# Patient Record
Sex: MALE | Race: WHITE | NOT HISPANIC OR LATINO | Employment: FULL TIME | URBAN - METROPOLITAN AREA
[De-identification: names, ages, dates, MRNs, and addresses within clinical notes are randomized per-mention and may not be internally consistent; named-entity substitution may affect disease eponyms.]

---

## 2023-02-28 LAB
CALCIDIOL (25 OH VITAMIN D3) (NG/ML) IN SER/PLAS: 19 NG/ML
COBALAMIN (VITAMIN B12) (PG/ML) IN SER/PLAS: 301 PG/ML (ref 211–911)

## 2023-03-01 LAB
ALLERGEN ANIMAL: CAT DANDER IGE (KU/L): 0.86 KU/L
ALLERGEN ANIMAL: CAT DANDER IGE (KU/L): 0.86 KU/L
ALLERGEN ANIMAL: DOG DANDER IGE (KU/L): 18.9 KU/L
ALLERGEN ANIMAL: DOG DANDER IGE (KU/L): 18.9 KU/L
ALLERGEN FOOD: EGG WHITE IGE (KU/L): <0.1 KU/L
ALLERGEN FOOD: FISH (COD) GADUS MORHUA) IGE (KU/L): <0.1 KU/L
ALLERGEN FOOD: MILK IGE (KU/L): <0.1 KU/L
ALLERGEN FOOD: PEANUT (ARACHIS HYPOGAEA) IGE (KU/L): <0.1 KU/L
ALLERGEN FOOD: SHRIMP (P. BOREALIS/MONODON, M. BARBATA/JOYNERI) IGE (KU/L): 0.37 KU/L
ALLERGEN FOOD: SOYBEAN (GLYCINE MAX) IGE (KU/L): <0.1 KU/L
ALLERGEN FOOD: WALNUT (JUGLANS SPP.) IGE (KU/L): <0.1 KU/L
ALLERGEN FOOD: WHEAT (TRITICUM AESTIVUM) IGE (KU/L): <0.1 KU/L
ALLERGEN GRASS: BERMUDA GRASS (CYNODON DACTYLON) IGE (KU/L): <0.1 KU/L
ALLERGEN GRASS: JOHNSON GRASS (SORGHUM HALEPENSE) IGE (KU/L): <0.1 KU/L
ALLERGEN GRASS: MEADOW GRASS, KENTUCKY BLUE (POA PRATENSIS )IGE (KU/L): <0.1 KU/L
ALLERGEN GRASS: TIMOTHY GRASS (PHLEUM PRATENSE) IGE (KU/L): <0.1 KU/L
ALLERGEN INSECT: COCKROACH IGE: 0.33 KU/L
ALLERGEN INSECT: COCKROACH IGE: 0.33 KU/L
ALLERGEN MICROORGANISM: ALTERNARIA ALTERNATA IGE (KU/L): <0.1 KU/L
ALLERGEN MICROORGANISM: ALTERNARIA ALTERNATA IGE (KU/L): <0.1 KU/L
ALLERGEN MICROORGANISM: ASPERGILLUS FUMIGATUS IGE (KU/L): <0.1 KU/L
ALLERGEN MICROORGANISM: CLADOSPORIUM HERBARUM IGE (KU/L): <0.1 KU/L
ALLERGEN MICROORGANISM: CLADOSPORIUM HERBARUM IGE (KU/L): <0.1 KU/L
ALLERGEN MICROORGANISM: PENICILLIUM CHRYSOGENUM (P. NOTATUM) IGE (KU/L): <0.1 KU/L
ALLERGEN MITE: DERMATOPHAGOIDES FARINAE (HOUSE DUST MITE) IGE (KU/L): 1.24 KU/L
ALLERGEN MITE: DERMATOPHAGOIDES FARINAE (HOUSE DUST MITE) IGE (KU/L): 1.24 KU/L
ALLERGEN MITE: DERMATOPHAGOIDES PTERONYSSINUS (HOUSE DUST MITE) IGE (KU/L): 0.95 KU/L
ALLERGEN MITE: DERMATOPHAGOIDES PTERONYSSINUS (HOUSE DUST MITE) IGE (KU/L): 0.95 KU/L
ALLERGEN TREE: BOX-ELDER (ACER NEGUNDO) IGE (KU/L): <0.1 KU/L
ALLERGEN TREE: COMMON SILVER BIRCH (BETULA VERRUCOSA) IGE (KU/L): <0.1 KU/L
ALLERGEN TREE: COTTONWOOD (POPULUS DELTOIDES) IGE (KU/L): <0.1 KU/L
ALLERGEN TREE: ELM (ULMUS AMERICANA) IGE (KU/L): <0.1 KU/L
ALLERGEN TREE: MAPLE LEAF SYCAMORE, LONDON PLANE IGE (KU/L): <0.1 KU/L
ALLERGEN TREE: MOUNTAIN JUNIPER (JUNIPERUS SABINOIDES) IGE (KU/L): <0.1 KU/L
ALLERGEN TREE: MULBERRY (MORUS ALBA) IGE (KU/L): <0.1 KU/L
ALLERGEN TREE: OAK (QUERCUS ALBA) IGE (KU/L): 0.11 KU/L
ALLERGEN TREE: PECAN, HICKORY (CARYA PECAN) IGE (KU/L): <0.1 KU/L
ALLERGEN TREE: WALNUT IGE: <0.1 KU/L
ALLERGEN TREE: WHITE ASH (FRAXINUS AMERICANA) IGE (KU/L): <0.1 KU/L
ALLERGEN WEED: COMMON PIGWEED (AMARANTHUS RETROFLEXUS) IGE (KU/L): <0.1 KU/L
ALLERGEN WEED: COMMON RAGWEED (AMB. ARTEMISIIFOLIA/A. ELATIOR) IGE (KU/L): <0.1 KU/L
ALLERGEN WEED: GOOSEFOOT, LAMB'S QUARTERS (CHENOPODIUM ALBUM) IGE (KU/L): 0.1 KU/L
ALLERGEN WEED: PLANTAIN (ENGLISH), RIBWORT (PLANTAGO LANCEOLATA) IGE (KU/L): <0.1 KU/L
ALLERGEN WEED: PRICKLY SALTWORT/RUSSIAN THISTLE (SALSOLA KALI) IGE (KU/L): <0.1 KU/L
ALLERGEN WEED: SHEEP SORREL (RUMEX ACETOSELLA) IGE (KU/L): <0.1 KU/L
IMMUNOCAP IGE: 158 KU/L (ref 0–214)
IMMUNOCAP IGE: 158 KU/L (ref 0–214)
IMMUNOCAP INTERPRETATION: ABNORMAL
IMMUNOCAP INTERPRETATION: ABNORMAL

## 2023-04-06 PROBLEM — M19.079 1ST MTP ARTHRITIS: Status: ACTIVE | Noted: 2023-04-06

## 2023-04-06 PROBLEM — E55.9 VITAMIN D DEFICIENCY: Status: ACTIVE | Noted: 2023-04-06

## 2023-04-06 PROBLEM — J02.9 PHARYNGITIS: Status: ACTIVE | Noted: 2023-04-06

## 2023-04-06 PROBLEM — D22.9 CHANGE IN MULTIPLE NEVI: Status: ACTIVE | Noted: 2023-04-06

## 2023-04-06 PROBLEM — M67.40 GANGLION CYST: Status: ACTIVE | Noted: 2023-04-06

## 2023-04-06 PROBLEM — J45.909 ASTHMA (HHS-HCC): Status: ACTIVE | Noted: 2023-04-06

## 2023-04-06 PROBLEM — S86.312A PERONEAL TENDON TEAR, LEFT, INITIAL ENCOUNTER: Status: ACTIVE | Noted: 2023-04-06

## 2023-04-06 PROBLEM — M54.50 LUMBAGO: Status: ACTIVE | Noted: 2023-04-06

## 2023-04-06 PROBLEM — M67.80 CYST OF TENDON SHEATH: Status: ACTIVE | Noted: 2023-04-06

## 2023-04-06 PROBLEM — R19.05 ABDOMINAL WALL MASS OF PERIUMBILICAL REGION: Status: ACTIVE | Noted: 2023-04-06

## 2023-04-06 PROBLEM — K43.9 SUPRAUMBILICAL HERNIA: Status: ACTIVE | Noted: 2023-04-06

## 2023-04-06 PROBLEM — T14.8XXA TENDON TEAR: Status: ACTIVE | Noted: 2023-04-06

## 2023-04-06 PROBLEM — F33.0 MILD EPISODE OF RECURRENT MAJOR DEPRESSIVE DISORDER (CMS-HCC): Status: ACTIVE | Noted: 2023-04-06

## 2023-04-06 PROBLEM — F41.9 ANXIETY: Status: ACTIVE | Noted: 2023-04-06

## 2023-04-06 RX ORDER — MONTELUKAST SODIUM 10 MG/1
1 TABLET ORAL DAILY
COMMUNITY
Start: 2018-10-03 | End: 2023-10-10

## 2023-04-06 RX ORDER — ACETAMINOPHEN 500 MG
50 TABLET ORAL DAILY
COMMUNITY
Start: 2022-10-13

## 2023-04-06 RX ORDER — FLUTICASONE PROPIONATE AND SALMETEROL 100; 50 UG/1; UG/1
1 POWDER RESPIRATORY (INHALATION) 2 TIMES DAILY
COMMUNITY
Start: 2022-05-04 | End: 2023-04-17

## 2023-04-06 RX ORDER — ALBUTEROL SULFATE 90 UG/1
1-2 AEROSOL, METERED RESPIRATORY (INHALATION) AS NEEDED
COMMUNITY
Start: 2018-10-03 | End: 2023-09-19

## 2023-04-06 RX ORDER — ERGOCALCIFEROL 1.25 MG/1
CAPSULE ORAL
COMMUNITY
End: 2024-01-17 | Stop reason: ALTCHOICE

## 2023-04-07 ENCOUNTER — OFFICE VISIT (OUTPATIENT)
Dept: PRIMARY CARE | Facility: CLINIC | Age: 33
End: 2023-04-07
Payer: COMMERCIAL

## 2023-04-07 VITALS
OXYGEN SATURATION: 98 % | BODY MASS INDEX: 24.92 KG/M2 | RESPIRATION RATE: 16 BRPM | WEIGHT: 178 LBS | SYSTOLIC BLOOD PRESSURE: 110 MMHG | DIASTOLIC BLOOD PRESSURE: 73 MMHG | TEMPERATURE: 98.2 F | HEIGHT: 71 IN | HEART RATE: 65 BPM

## 2023-04-07 DIAGNOSIS — R19.00 INTRA-ABDOMINAL AND PELVIC SWELLING, MASS AND LUMP, UNSPECIFIED SITE: ICD-10-CM

## 2023-04-07 DIAGNOSIS — R19.00 ABDOMINAL MASS, UNSPECIFIED ABDOMINAL LOCATION: Primary | ICD-10-CM

## 2023-04-07 PROBLEM — R19.05 ABDOMINAL WALL MASS OF PERIUMBILICAL REGION: Status: RESOLVED | Noted: 2023-04-06 | Resolved: 2023-04-07

## 2023-04-07 PROCEDURE — 1036F TOBACCO NON-USER: CPT | Performed by: NURSE PRACTITIONER

## 2023-04-07 PROCEDURE — 99213 OFFICE O/P EST LOW 20 MIN: CPT | Performed by: NURSE PRACTITIONER

## 2023-04-07 ASSESSMENT — PATIENT HEALTH QUESTIONNAIRE - PHQ9
2. FEELING DOWN, DEPRESSED OR HOPELESS: NOT AT ALL
1. LITTLE INTEREST OR PLEASURE IN DOING THINGS: NOT AT ALL
SUM OF ALL RESPONSES TO PHQ9 QUESTIONS 1 AND 2: 0

## 2023-04-07 NOTE — PROGRESS NOTES
Subjective   Patient ID: Justin Carcamo is a 33 y.o. male who presents for mass on abdomen.  4 days ago  Bump in lower abdomen  Painful and swollen  Has had umbilical hernia in past  Repaired  Thought maybe ingrown hair  Plucked hair  Was red  But  to touch  No other s/s        Review of Systems  ROS completely negative except what was mentioned in the HPI.  Problem List, surgical, social, and family histories which were reviewed and updated as necessary within the EMR. I also personally reviewed the notes, labs, and imaging that pertained to what was documented or discussed in the HPI.      Objective   Physical Exam  Vitals and nursing note reviewed.   Constitutional:       General: He is not in acute distress.     Appearance: Normal appearance. He is not ill-appearing.   HENT:      Head: Normocephalic and atraumatic.      Right Ear: External ear normal.      Left Ear: External ear normal.      Nose: Nose normal.      Mouth/Throat:      Mouth: Mucous membranes are moist.   Eyes:      Extraocular Movements: Extraocular movements intact.      Conjunctiva/sclera: Conjunctivae normal.      Pupils: Pupils are equal, round, and reactive to light.   Pulmonary:      Effort: Pulmonary effort is normal.   Musculoskeletal:         General: Normal range of motion.      Cervical back: Normal range of motion and neck supple.   Skin:     General: Skin is warm and dry.      Comments: 1.5 cm tender, non erythematous nodule palpated on mid lower quadrant of abdomen. Does not protrude with bearing down or recede with external pressure   Neurological:      General: No focal deficit present.      Mental Status: He is alert and oriented to person, place, and time. Mental status is at baseline.   Psychiatric:         Mood and Affect: Mood normal.         Behavior: Behavior normal.         Thought Content: Thought content normal.         Judgment: Judgment normal.         /73 (BP Location: Left arm, Patient Position:  "Sitting, BP Cuff Size: Adult)   Pulse 65   Temp 36.8 °C (98.2 °F) (Temporal)   Resp 16   Ht 1.803 m (5' 11\")   Wt 80.7 kg (178 lb)   SpO2 98%   BMI 24.83 kg/m²     Assessment/Plan   Problem List Items Addressed This Visit       Abdominal mass - Primary     Likely a cyst  But will get US to rule out hernia since he recently had periumbilical hernia repair  Plastics referral for removal         Relevant Orders    US abdomen limited    Referral to Plastic Surgery     "

## 2023-04-07 NOTE — ASSESSMENT & PLAN NOTE
Likely a cyst  But will get US to rule out hernia since he recently had periumbilical hernia repair  Plastics referral for removal

## 2023-04-16 DIAGNOSIS — J45.909 UNSPECIFIED ASTHMA, UNCOMPLICATED (HHS-HCC): ICD-10-CM

## 2023-04-17 RX ORDER — CEPHALEXIN 250 MG/1
CAPSULE ORAL
Qty: 180 EACH | Refills: 3 | Status: SHIPPED | OUTPATIENT
Start: 2023-04-17 | End: 2024-04-07

## 2023-10-10 DIAGNOSIS — J45.909 UNSPECIFIED ASTHMA, UNCOMPLICATED (HHS-HCC): ICD-10-CM

## 2023-10-10 RX ORDER — MONTELUKAST SODIUM 10 MG/1
10 TABLET ORAL DAILY
Qty: 90 TABLET | Refills: 3 | Status: SHIPPED | OUTPATIENT
Start: 2023-10-10

## 2024-01-17 ENCOUNTER — OFFICE VISIT (OUTPATIENT)
Dept: PRIMARY CARE | Facility: CLINIC | Age: 34
End: 2024-01-17
Payer: COMMERCIAL

## 2024-01-17 VITALS
BODY MASS INDEX: 25.37 KG/M2 | OXYGEN SATURATION: 98 % | HEART RATE: 64 BPM | SYSTOLIC BLOOD PRESSURE: 111 MMHG | DIASTOLIC BLOOD PRESSURE: 71 MMHG | HEIGHT: 71 IN | TEMPERATURE: 97.7 F | WEIGHT: 181.2 LBS

## 2024-01-17 DIAGNOSIS — S06.0X0A CONCUSSION WITHOUT LOSS OF CONSCIOUSNESS, INITIAL ENCOUNTER: Primary | ICD-10-CM

## 2024-01-17 PROCEDURE — 99213 OFFICE O/P EST LOW 20 MIN: CPT | Performed by: INTERNAL MEDICINE

## 2024-01-17 PROCEDURE — 1036F TOBACCO NON-USER: CPT | Performed by: INTERNAL MEDICINE

## 2024-01-17 RX ORDER — METHYLPHENIDATE 17.3 MG/1
TABLET, ORALLY DISINTEGRATING ORAL
COMMUNITY
Start: 2023-09-06 | End: 2024-04-03 | Stop reason: ALTCHOICE

## 2024-01-17 ASSESSMENT — PATIENT HEALTH QUESTIONNAIRE - PHQ9
1. LITTLE INTEREST OR PLEASURE IN DOING THINGS: NOT AT ALL
2. FEELING DOWN, DEPRESSED OR HOPELESS: NOT AT ALL
SUM OF ALL RESPONSES TO PHQ9 QUESTIONS 1 AND 2: 0

## 2024-01-17 NOTE — PROGRESS NOTES
"Subjective   Patient ID: Justin Carcamo is a 33 y.o. male who presents for Concussion (Hit head -top of head last night /No loss of consciousness/Headaches, sensitivity to sounds,sensitivity to light, struggling to focus  ).  HPI  Fell after hitting head  Ho concussion in [place]  Did not think lost consciousness at that time   Last night no oc   Had orchestra rehearsal  Lights hurt head     Tylenol helped  Cannot focus at work   It help desk  No vtg  No nausea  Balance ok    No vision change or focal weakness  Speech no trouble   Snowboarding 10 years ago     Review of Systems  Gen:  no fever  HEENT:  no trouble swallowing  CV:  no dyspnea, cyanosis  Lungs:  no shortness of breath  GI:  no constipation, no blood in stool  Vascular:  no edema  Neuro:   no weakness  Skin:  no rash  MS:no joint swelling  Gu:  no urinary complaints  All other systems have been reviewed and are negative for complaint    /71   Pulse 64   Temp 36.5 °C (97.7 °F) (Temporal)   Ht 1.803 m (5' 11\")   Wt 82.2 kg (181 lb 3.2 oz)   SpO2 98%   BMI 25.27 kg/m²   Objective   Physical Exam  Lab Results   Component Value Date    WBC 5.3 10/12/2022    HGB 14.5 10/12/2022    HCT 46.8 10/12/2022    MCV 85 10/12/2022     10/12/2022     Lab Results   Component Value Date    GLUCOSE 85 10/12/2022    CALCIUM 9.6 10/12/2022     10/12/2022    K 4.1 10/12/2022    CO2 32 10/12/2022     10/12/2022    BUN 11 10/12/2022    CREATININE 1.02 10/12/2022     Social History     Socioeconomic History    Marital status:      Spouse name: None    Number of children: None    Years of education: None    Highest education level: None   Occupational History    None   Tobacco Use    Smoking status: Never    Smokeless tobacco: Never   Substance and Sexual Activity    Alcohol use: Not Currently    Drug use: Never    Sexual activity: None   Other Topics Concern    None   Social History Narrative    None     Social Determinants of Health "     Financial Resource Strain: Not on file   Food Insecurity: Not on file   Transportation Needs: Not on file   Physical Activity: Not on file   Stress: Not on file   Social Connections: Not on file   Intimate Partner Violence: Not on file   Housing Stability: Not on file     Family History   Problem Relation Name Age of Onset    Breast cancer Mother      Diabetes Mother      Other (Brain tumor) Mother      Other (GI resection) Father          not cancer    Lung cancer Maternal Grandmother         General:  Alert and in  NAD  Heent:  tms nl, throat clear.     Lungs, CTAB  Skin:  no suspicious lesions,  warm and dry  Head :  Normocephalic  Neck/thyroid:  neck supple, full rom, no cervical lymphadenopathy  no thyromegaly  Heart:  RRR  no murmurs  Abdomen:  Normal , bs present, soft, nontender, not distended, no masses palpated  Extremities:  No clubbing, cyanosis, or edema  Neurologic:  Nonfocal  Psych: alert, normal mood      Nonfocal exam.  Equal 5/5 strength   Perrl , eomi  mild horizontal nystagmus  perrl  Dtrs equal plus 1-2   Clear speech , nl gait   Justin was seen today for concussion.  Diagnoses and all orders for this visit:  Concussion without loss of consciousness, initial encounter (Primary)  -     Referral to Physical Therapy; Future     Sx care discussed  Off work for 3 days  Fu prn   Go to er if sx worsen  Discussed no ct needed at this time, expect full recovery

## 2024-01-31 ENCOUNTER — OFFICE VISIT (OUTPATIENT)
Dept: PRIMARY CARE | Facility: CLINIC | Age: 34
End: 2024-01-31
Payer: COMMERCIAL

## 2024-01-31 ENCOUNTER — HOSPITAL ENCOUNTER (OUTPATIENT)
Dept: RADIOLOGY | Facility: CLINIC | Age: 34
Discharge: HOME | End: 2024-01-31
Payer: COMMERCIAL

## 2024-01-31 VITALS
TEMPERATURE: 98.4 F | HEART RATE: 80 BPM | WEIGHT: 178 LBS | DIASTOLIC BLOOD PRESSURE: 82 MMHG | OXYGEN SATURATION: 97 % | BODY MASS INDEX: 24.83 KG/M2 | SYSTOLIC BLOOD PRESSURE: 126 MMHG

## 2024-01-31 DIAGNOSIS — R05.9 COUGH, UNSPECIFIED TYPE: ICD-10-CM

## 2024-01-31 DIAGNOSIS — J45.41 MODERATE PERSISTENT ASTHMA WITH ACUTE EXACERBATION (HHS-HCC): ICD-10-CM

## 2024-01-31 DIAGNOSIS — R05.1 ACUTE COUGH: Primary | ICD-10-CM

## 2024-01-31 PROCEDURE — 99214 OFFICE O/P EST MOD 30 MIN: CPT | Performed by: INTERNAL MEDICINE

## 2024-01-31 PROCEDURE — 87637 SARSCOV2&INF A&B&RSV AMP PRB: CPT

## 2024-01-31 PROCEDURE — 71046 X-RAY EXAM CHEST 2 VIEWS: CPT | Mod: FR

## 2024-01-31 PROCEDURE — 1036F TOBACCO NON-USER: CPT | Performed by: INTERNAL MEDICINE

## 2024-01-31 PROCEDURE — 71045 X-RAY EXAM CHEST 1 VIEW: CPT | Mod: FOREIGN READ | Performed by: RADIOLOGY

## 2024-01-31 RX ORDER — AZITHROMYCIN 250 MG/1
TABLET, FILM COATED ORAL
Qty: 6 TABLET | Refills: 0 | Status: SHIPPED | OUTPATIENT
Start: 2024-01-31 | End: 2024-02-05

## 2024-01-31 RX ORDER — PREDNISONE 20 MG/1
60 TABLET ORAL DAILY
Qty: 15 TABLET | Refills: 0 | Status: SHIPPED | OUTPATIENT
Start: 2024-01-31 | End: 2024-02-05

## 2024-01-31 ASSESSMENT — PATIENT HEALTH QUESTIONNAIRE - PHQ9
2. FEELING DOWN, DEPRESSED OR HOPELESS: NOT AT ALL
SUM OF ALL RESPONSES TO PHQ9 QUESTIONS 1 AND 2: 0
1. LITTLE INTEREST OR PLEASURE IN DOING THINGS: NOT AT ALL

## 2024-01-31 NOTE — PROGRESS NOTES
Subjective   Patient ID: Justin Carcamo is a 33 y.o. male who presents for Cough (Congestion, fever, cough 3-4 days/House guest positive for covid/).  HPI  Fever  Deep cough  Fever broke last night  Always settles in chest  Has covid tests at home   Better from concussion   Review of Systems   HEENT:  no trouble swallowing  CV:  no dyspnea, cyanosis  Lungs:  no shortness of breath  GI:  no constipation, no blood in stool  Vascular:  no edema  Neuro:   no weakness  Skin:  no rash  MS:no joint swelling  Gu:  no urinary complaints  All other systems have been reviewed and are negative for complaint    /82   Pulse 80   Temp 36.9 °C (98.4 °F) (Temporal)   Wt 80.7 kg (178 lb)   SpO2 97%   BMI 24.83 kg/m²   Objective   Physical Exam  Lab Results   Component Value Date    WBC 5.3 10/12/2022    HGB 14.5 10/12/2022    HCT 46.8 10/12/2022    MCV 85 10/12/2022     10/12/2022     Lab Results   Component Value Date    GLUCOSE 85 10/12/2022    CALCIUM 9.6 10/12/2022     10/12/2022    K 4.1 10/12/2022    CO2 32 10/12/2022     10/12/2022    BUN 11 10/12/2022    CREATININE 1.02 10/12/2022     Social History     Socioeconomic History    Marital status:      Spouse name: None    Number of children: None    Years of education: None    Highest education level: None   Occupational History    None   Tobacco Use    Smoking status: Never    Smokeless tobacco: Never   Substance and Sexual Activity    Alcohol use: Not Currently    Drug use: Never    Sexual activity: None   Other Topics Concern    None   Social History Narrative    None     Social Determinants of Health     Financial Resource Strain: Not on file   Food Insecurity: Not on file   Transportation Needs: Not on file   Physical Activity: Not on file   Stress: Not on file   Social Connections: Not on file   Intimate Partner Violence: Not on file   Housing Stability: Not on file     Family History   Problem Relation Name Age of Onset    Breast  cancer Mother      Diabetes Mother      Other (Brain tumor) Mother      Other (GI resection) Father          not cancer    Lung cancer Maternal Grandmother         General:  Alert and in  NAD  Heent:  tms nl,      Lungs, diffuse exp wheeze  good ae    Skin:  no suspicious lesions,  warm and dry  Head :  Normocephalic  Neck/thyroid:  neck supple, full rom, no cervical lymphadenopathy  no thyromegaly  Heart:  RRR  no murmurs   Extremities:  No clubbing, cyanosis, or edema  Neurologic:  Nonfocal  Psych: alert, normal mood      Justin was seen today for cough.  Diagnoses and all orders for this visit:  Acute cough (Primary)  -     RSV PCR  -     Influenza A, and B PCR  -     Sars-CoV-2 PCR, Symptomatic  Cough, unspecified type  -     XR chest 2 views; Future  Moderate persistent asthma with acute exacerbation  -     predniSONE (Deltasone) 20 mg tablet; Take 3 tablets (60 mg) by mouth once daily for 5 days.  -     azithromycin (Zithromax) 250 mg tablet; Take 2 tablets (500 mg) by mouth once daily for 1 day, THEN 1 tablet (250 mg) once daily for 4 days. Take 2 tabs (500 mg) by mouth today, than 1 daily for 4 days..    Pt will do neb tx when he gets home  Go to er if sx worsen  Will call if covid pos at home as well  Would do paxlovid for him   Stop advair while on prednisone

## 2024-02-01 LAB
FLUAV RNA RESP QL NAA+PROBE: NOT DETECTED
FLUBV RNA RESP QL NAA+PROBE: NOT DETECTED
RSV RNA RESP QL NAA+PROBE: DETECTED
SARS-COV-2 RNA RESP QL NAA+PROBE: NOT DETECTED

## 2024-02-12 ENCOUNTER — APPOINTMENT (OUTPATIENT)
Dept: PRIMARY CARE | Facility: CLINIC | Age: 34
End: 2024-02-12
Payer: COMMERCIAL

## 2024-02-16 ENCOUNTER — APPOINTMENT (OUTPATIENT)
Dept: PRIMARY CARE | Facility: CLINIC | Age: 34
End: 2024-02-16
Payer: COMMERCIAL

## 2024-02-27 ENCOUNTER — APPOINTMENT (OUTPATIENT)
Dept: PRIMARY CARE | Facility: CLINIC | Age: 34
End: 2024-02-27
Payer: COMMERCIAL

## 2024-04-03 ENCOUNTER — OFFICE VISIT (OUTPATIENT)
Dept: PRIMARY CARE | Facility: CLINIC | Age: 34
End: 2024-04-03
Payer: COMMERCIAL

## 2024-04-03 VITALS
TEMPERATURE: 97.7 F | BODY MASS INDEX: 25.48 KG/M2 | HEIGHT: 70 IN | OXYGEN SATURATION: 98 % | WEIGHT: 178 LBS | SYSTOLIC BLOOD PRESSURE: 121 MMHG | HEART RATE: 70 BPM | DIASTOLIC BLOOD PRESSURE: 70 MMHG

## 2024-04-03 DIAGNOSIS — E55.9 VITAMIN D DEFICIENCY: ICD-10-CM

## 2024-04-03 DIAGNOSIS — J45.41 MODERATE PERSISTENT ASTHMA WITH ACUTE EXACERBATION (HHS-HCC): ICD-10-CM

## 2024-04-03 DIAGNOSIS — Z00.00 WELL ADULT EXAM: Primary | ICD-10-CM

## 2024-04-03 DIAGNOSIS — I45.9 INTRAVENTRICULAR CONDUCTION DELAY: ICD-10-CM

## 2024-04-03 DIAGNOSIS — Z79.899 MEDICATION MANAGEMENT: ICD-10-CM

## 2024-04-03 PROBLEM — T14.8XXA TENDON TEAR: Status: RESOLVED | Noted: 2023-04-06 | Resolved: 2024-04-03

## 2024-04-03 PROBLEM — M25.569 KNEE PAIN: Status: ACTIVE | Noted: 2024-04-03

## 2024-04-03 PROBLEM — R05.9 COUGH: Status: RESOLVED | Noted: 2024-04-03 | Resolved: 2024-04-03

## 2024-04-03 PROBLEM — J02.9 PHARYNGITIS: Status: RESOLVED | Noted: 2023-04-06 | Resolved: 2024-04-03

## 2024-04-03 PROBLEM — M79.646 PAIN OF FINGER: Status: ACTIVE | Noted: 2024-04-03

## 2024-04-03 PROBLEM — K92.1 HEMATOCHEZIA: Status: ACTIVE | Noted: 2024-04-03

## 2024-04-03 PROBLEM — R53.83 FATIGUE: Status: ACTIVE | Noted: 2024-04-03

## 2024-04-03 PROBLEM — J06.9 UPPER RESPIRATORY TRACT INFECTION: Status: RESOLVED | Noted: 2023-04-06 | Resolved: 2024-04-03

## 2024-04-03 PROBLEM — R53.83 FATIGUE: Status: RESOLVED | Noted: 2024-04-03 | Resolved: 2024-04-03

## 2024-04-03 PROBLEM — R10.84 GENERALIZED ABDOMINAL PAIN: Status: ACTIVE | Noted: 2024-04-03

## 2024-04-03 PROBLEM — M79.646 PAIN OF FINGER: Status: RESOLVED | Noted: 2024-04-03 | Resolved: 2024-04-03

## 2024-04-03 PROBLEM — J06.9 UPPER RESPIRATORY TRACT INFECTION: Status: ACTIVE | Noted: 2023-04-06

## 2024-04-03 PROBLEM — R10.84 GENERALIZED ABDOMINAL PAIN: Status: RESOLVED | Noted: 2024-04-03 | Resolved: 2024-04-03

## 2024-04-03 PROBLEM — R19.7 DIARRHEA: Status: RESOLVED | Noted: 2024-04-03 | Resolved: 2024-04-03

## 2024-04-03 PROBLEM — M25.569 KNEE PAIN: Status: RESOLVED | Noted: 2024-04-03 | Resolved: 2024-04-03

## 2024-04-03 PROBLEM — R05.9 COUGH: Status: ACTIVE | Noted: 2024-04-03

## 2024-04-03 PROBLEM — R04.2 BLOOD-STREAKED SPUTUM: Status: RESOLVED | Noted: 2024-04-03 | Resolved: 2024-04-03

## 2024-04-03 PROBLEM — R04.2 BLOOD-STREAKED SPUTUM: Status: ACTIVE | Noted: 2024-04-03

## 2024-04-03 PROBLEM — J84.9 INTERSTITIAL PNEUMONIA (MULTI): Status: RESOLVED | Noted: 2024-04-03 | Resolved: 2024-04-03

## 2024-04-03 PROBLEM — J84.9 INTERSTITIAL PNEUMONIA (MULTI): Status: ACTIVE | Noted: 2024-04-03

## 2024-04-03 PROBLEM — M99.09 SEGMENTAL AND SOMATIC DYSFUNCTION: Status: RESOLVED | Noted: 2024-04-03 | Resolved: 2024-04-03

## 2024-04-03 PROBLEM — M99.09 SEGMENTAL AND SOMATIC DYSFUNCTION: Status: ACTIVE | Noted: 2024-04-03

## 2024-04-03 PROBLEM — S86.312A PERONEAL TENDON TEAR, LEFT, INITIAL ENCOUNTER: Status: RESOLVED | Noted: 2023-04-06 | Resolved: 2024-04-03

## 2024-04-03 PROBLEM — S06.0X0A CONCUSSION WITHOUT LOSS OF CONSCIOUSNESS: Status: ACTIVE | Noted: 2024-04-03

## 2024-04-03 PROBLEM — R19.00 ABDOMINAL MASS: Status: RESOLVED | Noted: 2023-04-07 | Resolved: 2024-04-03

## 2024-04-03 PROBLEM — S33.6XXA SPRAIN OF SACROILIAC LIGAMENT: Status: ACTIVE | Noted: 2023-04-06

## 2024-04-03 PROBLEM — K92.1 HEMATOCHEZIA: Status: RESOLVED | Noted: 2024-04-03 | Resolved: 2024-04-03

## 2024-04-03 PROBLEM — M25.473 ANKLE SWELLING: Status: ACTIVE | Noted: 2024-04-03

## 2024-04-03 PROBLEM — R19.7 DIARRHEA: Status: ACTIVE | Noted: 2024-04-03

## 2024-04-03 PROCEDURE — 1036F TOBACCO NON-USER: CPT | Performed by: INTERNAL MEDICINE

## 2024-04-03 PROCEDURE — 93000 ELECTROCARDIOGRAM COMPLETE: CPT | Performed by: INTERNAL MEDICINE

## 2024-04-03 PROCEDURE — 99395 PREV VISIT EST AGE 18-39: CPT | Performed by: INTERNAL MEDICINE

## 2024-04-03 ASSESSMENT — PATIENT HEALTH QUESTIONNAIRE - PHQ9
1. LITTLE INTEREST OR PLEASURE IN DOING THINGS: NOT AT ALL
SUM OF ALL RESPONSES TO PHQ9 QUESTIONS 1 AND 2: 0
2. FEELING DOWN, DEPRESSED OR HOPELESS: NOT AT ALL

## 2024-04-03 NOTE — PROGRESS NOTES
"Subjective       Current Issues:  Current concerns include none  .asthma controlled  Has been, likes current advair dose    Sleep: all night if kids let him   No bowel or bladder issues  No cp or sob or depression    Review of Nutrition:  Current diet: trying  Exercise discussed    Gen:  no fever  HEENT:  no trouble swallowing  CV:  no dyspnea, cyanosis  Lungs:  no shortness of breath  GI:  no constipation, no blood in stool  Vascular:  no edema  Neuro:   no weakness  Skin:  no rash  MS:no joint swelling  Gu:  no urinary complaints  All other systems have been reviewed and are negative for complaint      Screening Questions:  Objective   /70   Pulse 70   Temp 36.5 °C (97.7 °F) (Temporal)   Ht 1.778 m (5' 10\")   Wt 80.7 kg (178 lb)   SpO2 98%   BMI 25.54 kg/m²       General:   alert and oriented, in no acute distress   Gait:   normal   Skin:   normal   Oral cavity:   Not examined pt w mask    Eyes:   sclerae white, pupils equal and reactive   Ears:   normal bilaterally Tms grey   Neck:   no adenopathy and thyroid not enlarged, symmetric, no tenderness/mass/nodules   Lungs:  clear to auscultation bilaterally   Heart:   regular rate and rhythm, S1, S2 normal, no murmur, click, rub or gallop   Abdomen:  soft, non-tender; bowel sounds normal; no masses, no organomegaly   : ne       Extremities:  extremities normal, warm and well-perfused; no cyanosis, clubbing, or edema,   Neuro:  normal without focal findings and muscle tone and strength normal and symmetric          Justin was seen today for annual exam.  Diagnoses and all orders for this visit:  Well adult exam (Primary)  -     Hemoglobin A1C; Future  -     Comprehensive Metabolic Panel; Future  -     TSH with reflex to Free T4 if abnormal; Future  -     Vitamin D 25-Hydroxy,Total (for eval of Vitamin D levels); Future  -     Vitamin B12; Future  -     Lipid Panel; Future  -     CBC and Auto Differential; Future  -     ECG 12 Lead  Moderate persistent " asthma with acute exacerbation  -     Hemoglobin A1C; Future  -     Comprehensive Metabolic Panel; Future  -     TSH with reflex to Free T4 if abnormal; Future  -     Vitamin B12; Future  -     Lipid Panel; Future  -     CBC and Auto Differential; Future  -     ECG 12 Lead  Vitamin D deficiency  -     Vitamin D 25-Hydroxy,Total (for eval of Vitamin D levels); Future  Medication management      Chronic conditions reviewed in the assessment and plan.    Continue medications unless specified otherwise.  Previous labs reviewed.    Fu in one year for well care and as otherwise stated in wrap up plans.

## 2024-04-06 DIAGNOSIS — J45.909 UNSPECIFIED ASTHMA, UNCOMPLICATED (HHS-HCC): ICD-10-CM

## 2024-04-07 RX ORDER — FLUTICASONE PROPIONATE AND SALMETEROL 100; 50 UG/1; UG/1
1 POWDER RESPIRATORY (INHALATION) 2 TIMES DAILY
Qty: 180 EACH | Refills: 3 | Status: SHIPPED | OUTPATIENT
Start: 2024-04-07

## 2024-04-16 ENCOUNTER — OFFICE VISIT (OUTPATIENT)
Dept: CARDIOLOGY | Facility: CLINIC | Age: 34
End: 2024-04-16
Payer: COMMERCIAL

## 2024-04-16 VITALS
DIASTOLIC BLOOD PRESSURE: 70 MMHG | HEART RATE: 64 BPM | WEIGHT: 177.4 LBS | HEIGHT: 72 IN | SYSTOLIC BLOOD PRESSURE: 106 MMHG | BODY MASS INDEX: 24.03 KG/M2

## 2024-04-16 DIAGNOSIS — R94.31 ABNORMAL EKG: ICD-10-CM

## 2024-04-16 DIAGNOSIS — Z82.49 FAMILY HISTORY OF ISCHEMIC HEART DISEASE: ICD-10-CM

## 2024-04-16 DIAGNOSIS — Z76.89 ENCOUNTER TO ESTABLISH CARE WITH NEW DOCTOR: ICD-10-CM

## 2024-04-16 DIAGNOSIS — I45.9 INTRAVENTRICULAR CONDUCTION DELAY: ICD-10-CM

## 2024-04-16 DIAGNOSIS — Z79.899 MEDICAL MARIJUANA USE: ICD-10-CM

## 2024-04-16 DIAGNOSIS — Z78.9 NEVER SMOKED CIGARETTES: ICD-10-CM

## 2024-04-16 PROCEDURE — 1036F TOBACCO NON-USER: CPT | Performed by: INTERNAL MEDICINE

## 2024-04-16 PROCEDURE — 93000 ELECTROCARDIOGRAM COMPLETE: CPT | Performed by: INTERNAL MEDICINE

## 2024-04-16 PROCEDURE — 3008F BODY MASS INDEX DOCD: CPT | Performed by: INTERNAL MEDICINE

## 2024-04-16 PROCEDURE — 99204 OFFICE O/P NEW MOD 45 MIN: CPT | Performed by: INTERNAL MEDICINE

## 2024-04-16 NOTE — PATIENT INSTRUCTIONS
Patient to follow up in 1 year with Dr. Lamar Magana MD [facility]     Discussed abnormal EKG with patient today.   If you feel palpitations- please record it.     No changes today.   Continue same medications and treatments.   Patient educated on proper medication use.   Patient educated on risk factor modification.   Please bring any lab results from other providers / physicians to your next appointment.     Please bring all medicines, vitamins, and herbal supplements with you when you come to the office.     Prescriptions will not be filled unless you are compliant with your follow up appointments or have a follow up appointment scheduled as per instruction of your physician. Refills should be requested at the time of your visit.    Eliseo CRUZ RN am scribing for and in the presence of Dr. Lamar Magana MD [facility]

## 2024-04-16 NOTE — PROGRESS NOTES
Referred by Dr. Giles, Yue ROSADO, * provider found for   Chief Complaint   Patient presents with    New Patient Visit     Referred by Dr. Giles for abnormal EKG        History of Present Illness  Justin Carcamo is a 34 y.o. year old male patient is here for cardiac evaluation.  He is a young patient had no previous cardiac history.  He is non-smoker although he admits that he has medical marijuana because of some psych issues.  He works in the [employer].  He had no history of hypertension no history of diabetes no significant family history premature coronary disease.  He had an regular EKG done showed short SC intervals.  Patient had no arrhythmia no palpitation no syncope or presyncope.  I discussed with the patient in great length that short SC intervals can represent extra pathway however the fact he is not having any arrhythmia any symptoms we will monitor this and advised him to call me if he has any symptoms of palpitation.  He will follow-up with me in 1 year    Past Medical History  Past Medical History:   Diagnosis Date    Other specified health status     No pertinent past medical history    Personal history of other medical treatment     H/O CT scan    Personal history of other medical treatment     H/O CT scan of chest       Social History  Social History     Tobacco Use    Smoking status: Never    Smokeless tobacco: Never   Substance Use Topics    Alcohol use: Not Currently    Drug use: Yes     Types: Marijuana     Comment: medical marijuana       Family History     Family History   Problem Relation Name Age of Onset    Breast cancer Mother      Diabetes Mother      Other (Brain tumor) Mother      Other (GI resection) Father          not cancer    Lung cancer Maternal Grandmother         Review of Systems  As per HPI, all other systems reviewed and negative.    Allergies:  Allergies   Allergen Reactions    Escitalopram Oxalate Other     Didn't feel well     Paroxetine Other     Dark thoughts     Sertraline Other     Dark thoughts         Outpatient Medications:  Current Outpatient Medications   Medication Instructions    albuterol 90 mcg/actuation inhaler INHALE 1 TO 2 PUFFS BY MOUTH EVERY 4 TO 6 HOURS AS NEEDED    cholecalciferol (VITAMIN D-3) 50 mcg, oral, Daily    fluticasone propion-salmeteroL (Advair Diskus) 100-50 mcg/dose diskus inhaler 1 puff, inhalation, 2 times daily    montelukast (SINGULAIR) 10 mg, oral, Daily         Vitals:  Vitals:    04/16/24 0749   BP: 106/70   Pulse: 64       Physical Exam:  Physical Exam  Constitutional:       Appearance: Normal appearance.   HENT:      Head: Normocephalic and atraumatic.   Eyes:      Extraocular Movements: Extraocular movements intact.      Pupils: Pupils are equal, round, and reactive to light.   Cardiovascular:      Rate and Rhythm: Normal rate and regular rhythm.      Pulses: Normal pulses.      Heart sounds: Normal heart sounds.   Pulmonary:      Effort: Pulmonary effort is normal.      Breath sounds: Normal breath sounds.   Abdominal:      General: Abdomen is flat.      Palpations: Abdomen is soft.   Musculoskeletal:      Right lower leg: No edema.      Left lower leg: No edema.   Skin:     General: Skin is warm and dry.   Neurological:      General: No focal deficit present.      Mental Status: He is alert and oriented to person, place, and time.             Assessment/Plan   Diagnoses and all orders for this visit:  Intraventricular conduction delay  -     Referral to Cardiology  Abnormal EKG  Family history of ischemic heart disease  Encounter to establish care with new doctor  Never smoked cigarettes  Medical marijuana use  BMI 24.0-24.9, adult          Lamar Magana MD [facility]  Interventional Cardiology   of [facility]     Thank you for allowing me to participate in the care of this patient. Please do not hesitate to contact me with any further questions or concerns.

## 2024-09-12 ENCOUNTER — TELEPHONE (OUTPATIENT)
Dept: PRIMARY CARE | Facility: CLINIC | Age: 34
End: 2024-09-12
Payer: COMMERCIAL

## 2024-09-12 NOTE — TELEPHONE ENCOUNTER
Pt called inquiring if he could have a PSA test ordered. Pt just finished with Covid and wanted this test on record.

## 2024-09-12 NOTE — TELEPHONE ENCOUNTER
I called Pt for clarification. He does not want PSA lab test. He wanted to be scheduled to come in for PCR covid test. Pt scheduled tomorrow with NP to be swabbed. Pt has been experience fatigue, headaches and slight sore throat.

## 2024-09-13 ENCOUNTER — OFFICE VISIT (OUTPATIENT)
Dept: PRIMARY CARE | Facility: CLINIC | Age: 34
End: 2024-09-13
Payer: COMMERCIAL

## 2024-09-13 VITALS
BODY MASS INDEX: 25 KG/M2 | DIASTOLIC BLOOD PRESSURE: 71 MMHG | TEMPERATURE: 98.7 F | OXYGEN SATURATION: 96 % | SYSTOLIC BLOOD PRESSURE: 108 MMHG | HEART RATE: 70 BPM | WEIGHT: 181.8 LBS

## 2024-09-13 DIAGNOSIS — J06.9 UPPER RESPIRATORY TRACT INFECTION, UNSPECIFIED TYPE: Primary | ICD-10-CM

## 2024-09-13 LAB — POC RAPID STREP: NEGATIVE

## 2024-09-13 PROCEDURE — 1036F TOBACCO NON-USER: CPT | Performed by: NURSE PRACTITIONER

## 2024-09-13 PROCEDURE — 87880 STREP A ASSAY W/OPTIC: CPT | Performed by: NURSE PRACTITIONER

## 2024-09-13 PROCEDURE — 87637 SARSCOV2&INF A&B&RSV AMP PRB: CPT

## 2024-09-13 PROCEDURE — 99213 OFFICE O/P EST LOW 20 MIN: CPT | Performed by: NURSE PRACTITIONER

## 2024-09-13 NOTE — PROGRESS NOTES
"Problem List Items Addressed This Visit    None  Visit Diagnoses       Upper respiratory tract infection, unspecified type    -  Primary    strep neg  rsv, flu, sars sent  cont symptomatic care  prn fu io    Relevant Orders    Sars-CoV-2 PCR    RSV PCR    Influenza A, and B PCR    POCT Rapid Strep A manually resulted (Completed)             Subjective   Patient ID: Justin Carcamo is a 34 y.o. male who presents for Sick Visit (Covid swap/Started Monday felt terrible headache congestion/Tested home test faint line/Can he get blood work for antigen).  HPI  Sx onset: 24  Home test: 24  test  Vaccinated: yes  Last covid pos: never  Recently traveled from [place]     Slight fever on Monday 99.5  Eating fine  No vmtg or diarrhea  No rash or sore throat  Little hoarse  Ear fullness    Review of Systems   All other systems reviewed and are negative.      BP Readings from Last 3 Encounters:   24 108/71   24 106/70   24 121/70      Wt Readings from Last 3 Encounters:   24 82.5 kg (181 lb 12.8 oz)   24 80.5 kg (177 lb 6.4 oz)   24 80.7 kg (178 lb)      BMI:   Estimated body mass index is 25 kg/m² as calculated from the following:    Height as of 24: 1.816 m (5' 11.5\").    Weight as of this encounter: 82.5 kg (181 lb 12.8 oz).    Objective   Physical Exam  Constitutional:       General: He is not in acute distress.  HENT:      Head: Normocephalic and atraumatic.      Right Ear: Tympanic membrane and external ear normal.      Left Ear: Tympanic membrane and external ear normal.      Nose: Nose normal.      Mouth/Throat:      Mouth: Mucous membranes are moist.      Pharynx: Posterior oropharyngeal erythema present. No oropharyngeal exudate.   Eyes:      Extraocular Movements: Extraocular movements intact.      Conjunctiva/sclera: Conjunctivae normal.   Cardiovascular:      Rate and Rhythm: Normal rate and regular rhythm.      Pulses: Normal pulses.   Pulmonary:      Effort: " Pulmonary effort is normal.      Breath sounds: Normal breath sounds.   Musculoskeletal:         General: Normal range of motion.      Cervical back: Normal range of motion and neck supple.   Skin:     General: Skin is warm and dry.   Neurological:      General: No focal deficit present.      Mental Status: He is alert.   Psychiatric:         Mood and Affect: Mood normal.

## 2024-09-14 LAB
FLUAV RNA RESP QL NAA+PROBE: NOT DETECTED
FLUBV RNA RESP QL NAA+PROBE: NOT DETECTED
RSV RNA RESP QL NAA+PROBE: NOT DETECTED
SARS-COV-2 RNA RESP QL NAA+PROBE: NOT DETECTED

## 2024-09-24 ENCOUNTER — HOSPITAL ENCOUNTER (OUTPATIENT)
Dept: RADIOLOGY | Facility: CLINIC | Age: 34
Discharge: HOME | End: 2024-09-24
Payer: COMMERCIAL

## 2024-09-24 ENCOUNTER — OFFICE VISIT (OUTPATIENT)
Dept: PRIMARY CARE | Facility: CLINIC | Age: 34
End: 2024-09-24
Payer: COMMERCIAL

## 2024-09-24 VITALS
TEMPERATURE: 98.3 F | SYSTOLIC BLOOD PRESSURE: 110 MMHG | OXYGEN SATURATION: 96 % | HEART RATE: 69 BPM | DIASTOLIC BLOOD PRESSURE: 77 MMHG

## 2024-09-24 DIAGNOSIS — S39.92XA BACK INJURY, INITIAL ENCOUNTER: ICD-10-CM

## 2024-09-24 DIAGNOSIS — S29.9XXA TRAUMATIC INJURY OF RIB: Primary | ICD-10-CM

## 2024-09-24 DIAGNOSIS — S29.9XXA TRAUMATIC INJURY OF RIB: ICD-10-CM

## 2024-09-24 PROCEDURE — 71101 X-RAY EXAM UNILAT RIBS/CHEST: CPT | Mod: LEFT SIDE | Performed by: RADIOLOGY

## 2024-09-24 PROCEDURE — 71101 X-RAY EXAM UNILAT RIBS/CHEST: CPT | Mod: LT

## 2024-09-24 PROCEDURE — 72072 X-RAY EXAM THORAC SPINE 3VWS: CPT | Performed by: RADIOLOGY

## 2024-09-24 PROCEDURE — 99213 OFFICE O/P EST LOW 20 MIN: CPT | Performed by: NURSE PRACTITIONER

## 2024-09-24 PROCEDURE — 1036F TOBACCO NON-USER: CPT | Performed by: NURSE PRACTITIONER

## 2024-09-24 PROCEDURE — 72110 X-RAY EXAM L-2 SPINE 4/>VWS: CPT

## 2024-09-24 PROCEDURE — 72072 X-RAY EXAM THORAC SPINE 3VWS: CPT

## 2024-09-24 PROCEDURE — 72110 X-RAY EXAM L-2 SPINE 4/>VWS: CPT | Performed by: RADIOLOGY

## 2024-09-24 RX ORDER — METHYLPREDNISOLONE 4 MG/1
TABLET ORAL
Qty: 21 TABLET | Refills: 0 | Status: SHIPPED | OUTPATIENT
Start: 2024-09-24 | End: 2024-10-01

## 2024-09-24 RX ORDER — CYCLOBENZAPRINE HCL 5 MG
TABLET ORAL
Qty: 60 TABLET | Refills: 1 | Status: SHIPPED | OUTPATIENT
Start: 2024-09-24

## 2024-09-24 ASSESSMENT — PAIN SCALES - GENERAL: PAINLEVEL: 8

## 2024-09-24 NOTE — PROGRESS NOTES
Subjective   Patient ID: Justin Carcamo is a 34 y.o. male who presents for Back Pain.    9/22 Injured back go carting   slammed into wall  Movement is difficult   Difficult coughing breathing   Possible bruised or cracked rib   Difficulty sleeping   Pain getting worse    Left side back by lower rib cage   8-9/10 with movement  Sharp and spasm  Coughing is the worse  No bruising  No coughing blood  No abdominal pain  Touching hurts back ribs  Tylenol and stretching          Review of Systems  ROS completely negative except what was mentioned in the HPI.  Problem List, surgical, social, and family histories which were reviewed and updated as necessary within the EMR. I also personally reviewed the notes, labs, and imaging that pertained to what was documented or discussed in the HPI.    Objective   Physical Exam  Vitals and nursing note reviewed.   Constitutional:       General: He is not in acute distress.     Appearance: Normal appearance. He is not ill-appearing.   HENT:      Head: Normocephalic and atraumatic.      Right Ear: External ear normal.      Left Ear: External ear normal.      Nose: Nose normal.      Mouth/Throat:      Mouth: Mucous membranes are moist.   Eyes:      Extraocular Movements: Extraocular movements intact.      Conjunctiva/sclera: Conjunctivae normal.      Pupils: Pupils are equal, round, and reactive to light.   Cardiovascular:      Rate and Rhythm: Normal rate and regular rhythm.      Heart sounds: Normal heart sounds.   Pulmonary:      Effort: Pulmonary effort is normal.      Breath sounds: Normal breath sounds.   Musculoskeletal:         General: No swelling. Normal range of motion.      Cervical back: Normal range of motion and neck supple.      Comments: Moderate tenderness left of spine near 10-12th left posterior ribs   Skin:     General: Skin is warm and dry.   Neurological:      General: No focal deficit present.      Mental Status: He is alert and oriented to person, place, and  time. Mental status is at baseline.   Psychiatric:         Mood and Affect: Mood normal.         Behavior: Behavior normal.         Thought Content: Thought content normal.         Judgment: Judgment normal.         /77   Pulse 69   Temp 36.8 °C (98.3 °F) (Temporal)   SpO2 96%     Assessment/Plan    Problem List Items Addressed This Visit    None  Visit Diagnoses       Traumatic injury of rib    -  Primary    Relevant Medications    methylPREDNISolone (Medrol Dospak) 4 mg tablets    cyclobenzaprine (Flexeril) 5 mg tablet    Other Relevant Orders    XR ribs 2 views left w chest pa or ap    XR thoracic spine complete 4+ views    XR lumbar spine complete 4+ views    Back injury, initial encounter        Relevant Medications    methylPREDNISolone (Medrol Dospak) 4 mg tablets    cyclobenzaprine (Flexeril) 5 mg tablet    Other Relevant Orders    XR ribs 2 views left w chest pa or ap    XR thoracic spine complete 4+ views    XR lumbar spine complete 4+ views

## 2024-10-07 DIAGNOSIS — J45.909 UNSPECIFIED ASTHMA, UNCOMPLICATED (HHS-HCC): ICD-10-CM

## 2024-10-08 RX ORDER — ALBUTEROL SULFATE 90 UG/1
INHALANT RESPIRATORY (INHALATION)
Qty: 18 G | Refills: 3 | Status: SHIPPED | OUTPATIENT
Start: 2024-10-08

## 2024-10-28 DIAGNOSIS — J45.909 UNSPECIFIED ASTHMA, UNCOMPLICATED (HHS-HCC): ICD-10-CM

## 2024-10-28 RX ORDER — MONTELUKAST SODIUM 10 MG/1
10 TABLET ORAL DAILY
Qty: 90 TABLET | Refills: 3 | Status: SHIPPED | OUTPATIENT
Start: 2024-10-28

## 2025-02-24 ENCOUNTER — OFFICE VISIT (OUTPATIENT)
Dept: PRIMARY CARE | Facility: CLINIC | Age: 35
End: 2025-02-24
Payer: COMMERCIAL

## 2025-02-24 VITALS
BODY MASS INDEX: 24.81 KG/M2 | DIASTOLIC BLOOD PRESSURE: 73 MMHG | HEART RATE: 86 BPM | TEMPERATURE: 97.8 F | HEIGHT: 72 IN | SYSTOLIC BLOOD PRESSURE: 126 MMHG | WEIGHT: 183.2 LBS | OXYGEN SATURATION: 97 %

## 2025-02-24 DIAGNOSIS — J45.41 MODERATE PERSISTENT ASTHMA WITH ACUTE EXACERBATION (HHS-HCC): Primary | ICD-10-CM

## 2025-02-24 PROBLEM — M25.473 ANKLE SWELLING: Status: RESOLVED | Noted: 2024-04-03 | Resolved: 2025-02-24

## 2025-02-24 PROBLEM — S06.0X0A CONCUSSION WITHOUT LOSS OF CONSCIOUSNESS: Status: RESOLVED | Noted: 2024-04-03 | Resolved: 2025-02-24

## 2025-02-24 PROCEDURE — 99213 OFFICE O/P EST LOW 20 MIN: CPT | Performed by: NURSE PRACTITIONER

## 2025-02-24 PROCEDURE — 3008F BODY MASS INDEX DOCD: CPT | Performed by: NURSE PRACTITIONER

## 2025-02-24 PROCEDURE — 1036F TOBACCO NON-USER: CPT | Performed by: NURSE PRACTITIONER

## 2025-02-24 RX ORDER — AZITHROMYCIN 250 MG/1
TABLET, FILM COATED ORAL
Qty: 6 TABLET | Refills: 0 | Status: SHIPPED | OUTPATIENT
Start: 2025-02-24 | End: 2025-03-01

## 2025-02-24 RX ORDER — PREDNISONE 20 MG/1
40 TABLET ORAL DAILY
Qty: 10 TABLET | Refills: 0 | Status: SHIPPED | OUTPATIENT
Start: 2025-02-24 | End: 2025-03-01

## 2025-02-24 RX ORDER — BENZONATATE 200 MG/1
200 CAPSULE ORAL 3 TIMES DAILY PRN
Qty: 30 CAPSULE | Refills: 0 | Status: SHIPPED | OUTPATIENT
Start: 2025-02-24 | End: 2025-03-06

## 2025-02-24 NOTE — PROGRESS NOTES
"Problem List Items Addressed This Visit          Medium    Asthma - Primary    Overview     Singular, alb, advair         Current Assessment & Plan     Acute exacerbation s/p influenza A  Start abx now  Steroid burst if needed  Cont inhalers  XR chest if not improving           Relevant Medications    azithromycin (Zithromax) 250 mg tablet    benzonatate (Tessalon) 200 mg capsule    predniSONE (Deltasone) 20 mg tablet    Other Relevant Orders    XR chest 2 views        Subjective   Patient ID: Justin Carcamo is a 35 y.o. male who presents for Sick Visit (Cough sinus congestion and asthma after flu a tested pos 2/15 for flu A/Today is the first day he has not had a fever coughing up phlegm and nasal congestion yellow green in color).  HPI  Home flu A positive 2/15/25  Tmax 101  Here toay 97.8 IO  - no fever reducer today  Cough is productive  Using advair bid  Last FARNKLIN this morning [id number]  Also using singulair  No sore throat  No ear pain  Max sinus bilat pressure    Review of Systems   All other systems reviewed and are negative.      BP Readings from Last 3 Encounters:   02/24/25 126/73   09/24/24 110/77   09/13/24 108/71      Wt Readings from Last 3 Encounters:   02/24/25 83.1 kg (183 lb 3.2 oz)   09/13/24 82.5 kg (181 lb 12.8 oz)   04/16/24 80.5 kg (177 lb 6.4 oz)      BMI:   Estimated body mass index is 25.2 kg/m² as calculated from the following:    Height as of this encounter: 1.816 m (5' 11.5\").    Weight as of this encounter: 83.1 kg (183 lb 3.2 oz).    Objective   Physical Exam  Constitutional:       General: He is not in acute distress.  HENT:      Head: Normocephalic and atraumatic.      Right Ear: Tympanic membrane and external ear normal.      Left Ear: Tympanic membrane and external ear normal.      Nose: Nose normal.      Mouth/Throat:      Mouth: Mucous membranes are moist.   Eyes:      Extraocular Movements: Extraocular movements intact.      Conjunctiva/sclera: Conjunctivae normal.   Neck:    "   Vascular: No carotid bruit.   Cardiovascular:      Rate and Rhythm: Normal rate and regular rhythm.      Pulses: Normal pulses.   Pulmonary:      Effort: Pulmonary effort is normal.      Breath sounds: Normal breath sounds.      Comments: Harsh bronchospasms   Musculoskeletal:         General: Normal range of motion.      Cervical back: Normal range of motion and neck supple.   Lymphadenopathy:      Cervical: No cervical adenopathy.   Skin:     General: Skin is warm and dry.   Neurological:      General: No focal deficit present.      Mental Status: He is alert.   Psychiatric:         Mood and Affect: Mood normal.

## 2025-02-24 NOTE — ASSESSMENT & PLAN NOTE
Acute exacerbation s/p influenza A  Start abx now  Steroid burst if needed  Cont inhalers  XR chest if not improving

## 2025-03-14 DIAGNOSIS — J45.909 UNSPECIFIED ASTHMA, UNCOMPLICATED (HHS-HCC): ICD-10-CM

## 2025-03-14 RX ORDER — ALBUTEROL SULFATE 90 UG/1
INHALANT RESPIRATORY (INHALATION)
Qty: 18 G | Refills: 3 | Status: SHIPPED | OUTPATIENT
Start: 2025-03-14

## 2025-04-04 ENCOUNTER — APPOINTMENT (OUTPATIENT)
Dept: PRIMARY CARE | Facility: CLINIC | Age: 35
End: 2025-04-04
Payer: COMMERCIAL

## 2025-04-15 ENCOUNTER — APPOINTMENT (OUTPATIENT)
Dept: CARDIOLOGY | Facility: CLINIC | Age: 35
End: 2025-04-15
Payer: COMMERCIAL

## 2025-04-15 VITALS
SYSTOLIC BLOOD PRESSURE: 110 MMHG | BODY MASS INDEX: 26.19 KG/M2 | HEART RATE: 56 BPM | DIASTOLIC BLOOD PRESSURE: 66 MMHG | HEIGHT: 71 IN | WEIGHT: 187.1 LBS

## 2025-04-15 DIAGNOSIS — J45.41 MODERATE PERSISTENT ASTHMA WITH ACUTE EXACERBATION (HHS-HCC): ICD-10-CM

## 2025-04-15 DIAGNOSIS — Z78.9 NEVER SMOKED CIGARETTES: ICD-10-CM

## 2025-04-15 DIAGNOSIS — Z82.49 FAMILY HISTORY OF ISCHEMIC HEART DISEASE: ICD-10-CM

## 2025-04-15 DIAGNOSIS — R94.31 ABNORMAL EKG: ICD-10-CM

## 2025-04-15 PROCEDURE — 3008F BODY MASS INDEX DOCD: CPT | Performed by: INTERNAL MEDICINE

## 2025-04-15 PROCEDURE — 99214 OFFICE O/P EST MOD 30 MIN: CPT | Performed by: INTERNAL MEDICINE

## 2025-04-15 PROCEDURE — 93000 ELECTROCARDIOGRAM COMPLETE: CPT | Performed by: INTERNAL MEDICINE

## 2025-04-15 PROCEDURE — 1036F TOBACCO NON-USER: CPT | Performed by: INTERNAL MEDICINE

## 2025-04-15 NOTE — PATIENT INSTRUCTIONS
Patient to follow up in 1 year with Dr. Lamar Magana MD Located within [facility]     Office will arrange repeat EKG in 1 year for surveillance again.   EKG this year is similar to that of past.     No other changes today.   Continue same medications and treatments.   Patient educated on proper medication use.   Patient educated on risk factor modification.   Please bring any lab results from other providers / physicians to your next appointment.     Please bring all medicines, vitamins, and herbal supplements with you when you come to the office.     Prescriptions will not be filled unless you are compliant with your follow up appointments or have a follow up appointment scheduled as per instruction of your physician. Refills should be requested at the time of your visit.    Eliseo CRUZ RN am scribing for and in the presence of Dr. Lamar Magana MD Located within [facility]

## 2025-04-15 NOTE — PROGRESS NOTES
Referred by Dr. Ch ref. provider found provider found for   Chief Complaint   Patient presents with    Follow-up     1 year follow-up for management of intraventricular conduction delay        Chief complaint:   Chief Complaint   Patient presents with    Follow-up     1 year follow-up for management of intraventricular conduction delay        History of Present Illness  Justin Carcamo is a 35 y.o. year old male patient here for follow-up.  Noted to have a short WI interval.  No arrhythmia no palpitation has been active with been walking with no difficulty.  Blood pressure is adequately controlled.  Today's EKG showed sinus bradycardia with short WI intervals.  The patient completely asymptomatic at this point.  Discussed with the patient that continues to call us back if he has any palpitation or arrhythmia otherwise we will continue medical management call for any problems and follow-up as scheduled    Past Medical History  Past Medical History:   Diagnosis Date    Other specified health status     No pertinent past medical history    Personal history of other medical treatment     H/O CT scan    Personal history of other medical treatment     H/O CT scan of chest       Social History  Social History     Tobacco Use    Smoking status: Never    Smokeless tobacco: Never   Substance Use Topics    Alcohol use: Not Currently    Drug use: Yes     Types: Marijuana     Comment: medical marijuana       Family History     Family History   Problem Relation Name Age of Onset    Breast cancer Mother      Diabetes Mother      Other (Brain tumor) Mother      Other (GI resection) Father          not cancer    Lung cancer Maternal Grandmother         Review of Systems  As per HPI, all other systems reviewed and negative.    Allergies:  Allergies   Allergen Reactions    Escitalopram Oxalate Other     Didn't feel well     Paroxetine Other     Dark thoughts    Sertraline Other     Dark thoughts         Outpatient  Medications:  Current Outpatient Medications   Medication Instructions    albuterol 90 mcg/actuation inhaler INHALE 1 TO 2 PUFFS BY MOUTH EVERY 4 TO 6 HOURS AS NEEDED    cholecalciferol (VITAMIN D-3) 50 mcg, Daily    fluticasone propion-salmeteroL (Advair Diskus) 100-50 mcg/dose diskus inhaler 1 puff, inhalation, 2 times daily    montelukast (SINGULAIR) 10 mg, oral, Daily         Vitals:  Vitals:    04/15/25 0812   BP: 110/66   Pulse: 56       Physical Exam:  Physical Exam  Constitutional:       Appearance: Normal appearance.   HENT:      Head: Normocephalic and atraumatic.   Eyes:      Extraocular Movements: Extraocular movements intact.      Pupils: Pupils are equal, round, and reactive to light.   Cardiovascular:      Rate and Rhythm: Normal rate and regular rhythm.      Pulses: Normal pulses.      Heart sounds: Normal heart sounds.   Pulmonary:      Effort: Pulmonary effort is normal.      Breath sounds: Normal breath sounds.   Abdominal:      General: Abdomen is flat.      Palpations: Abdomen is soft.   Musculoskeletal:      Right lower leg: No edema.      Left lower leg: No edema.   Skin:     General: Skin is warm and dry.   Neurological:      General: No focal deficit present.      Mental Status: He is alert and oriented to person, place, and time.             Assessment/Plan   Diagnoses and all orders for this visit:  Family history of ischemic heart disease  BMI 26.0-26.9,adult  Moderate persistent asthma with acute exacerbation ([facility])  Never smoked cigarettes  Abnormal EKG          IEliseo RN   am scribing for, and in the presence of Dr. [facility] .    I, Dr. Lamar Magana MD [facility]  personally performed the services described in the documentation as scribed by Eliseo Kimble RN   in my presence, and confirm it is both accurate and complete.      Lamar Magana MD FACC  Interventional Cardiology  Thank you for allowing me to participate in the care of this  patient. Please do not hesitate to contact me with any further questions or concerns.

## 2025-04-24 DIAGNOSIS — J45.909 UNSPECIFIED ASTHMA, UNCOMPLICATED (HHS-HCC): ICD-10-CM

## 2025-04-24 RX ORDER — FLUTICASONE PROPIONATE AND SALMETEROL 100; 50 UG/1; UG/1
1 POWDER RESPIRATORY (INHALATION) 2 TIMES DAILY
Qty: 180 EACH | Refills: 3 | Status: SHIPPED | OUTPATIENT
Start: 2025-04-24

## 2025-04-24 NOTE — TELEPHONE ENCOUNTER
"Refill Request      Last OV with PCP 2/24/2025     Future Appointments       Date / Time Provider Department Dept Phone    6/3/2025 5:00 PM (Arrive by 4:45 PM) Yue Giles MD  [facility] [phone]    4/14/2026 8:15 AM Lamar Magana MD [facility] [phone]          No results found for: \"HGBA1C\"  Lab Results   Component Value Date    CHOL 186 10/12/2022    CHOL 200 (H) 09/14/2021     Lab Results   Component Value Date    HDL 46.0 10/12/2022    HDL 47.0 09/14/2021     No results found for: \"LDLCALC\"  Lab Results   Component Value Date    TRIG 119 10/12/2022    TRIG 152 (H) 09/14/2021     No components found for: \"CHOLHDL\"  Lab Results   Component Value Date    TSH 2.07 10/12/2022     Lab Results   Component Value Date    GLUCOSE 85 10/12/2022    CALCIUM 9.6 10/12/2022     10/12/2022    K 4.1 10/12/2022    CO2 32 10/12/2022     10/12/2022    BUN 11 10/12/2022    CREATININE 1.02 10/12/2022       "

## 2025-05-21 ENCOUNTER — OFFICE VISIT (OUTPATIENT)
Dept: PRIMARY CARE | Facility: CLINIC | Age: 35
End: 2025-05-21
Payer: MEDICAID

## 2025-05-21 VITALS
WEIGHT: 188.4 LBS | HEIGHT: 71 IN | HEART RATE: 73 BPM | DIASTOLIC BLOOD PRESSURE: 69 MMHG | OXYGEN SATURATION: 96 % | BODY MASS INDEX: 26.38 KG/M2 | TEMPERATURE: 98.2 F | SYSTOLIC BLOOD PRESSURE: 112 MMHG

## 2025-05-21 DIAGNOSIS — J45.21 MILD INTERMITTENT ASTHMA WITH ACUTE EXACERBATION (HHS-HCC): ICD-10-CM

## 2025-05-21 DIAGNOSIS — L03.213 PERIORBITAL CELLULITIS OF LEFT EYE: Primary | ICD-10-CM

## 2025-05-21 PROCEDURE — 1036F TOBACCO NON-USER: CPT | Performed by: NURSE PRACTITIONER

## 2025-05-21 PROCEDURE — 99213 OFFICE O/P EST LOW 20 MIN: CPT | Performed by: NURSE PRACTITIONER

## 2025-05-21 PROCEDURE — 3008F BODY MASS INDEX DOCD: CPT | Performed by: NURSE PRACTITIONER

## 2025-05-21 RX ORDER — POLYMYXIN B SULFATE AND TRIMETHOPRIM 1; 10000 MG/ML; [USP'U]/ML
1 SOLUTION OPHTHALMIC
Qty: 10 ML | Refills: 0 | Status: SHIPPED | OUTPATIENT
Start: 2025-05-21 | End: 2025-05-28

## 2025-05-21 RX ORDER — AMOXICILLIN AND CLAVULANATE POTASSIUM 875; 125 MG/1; MG/1
875 TABLET, FILM COATED ORAL 2 TIMES DAILY
Qty: 14 TABLET | Refills: 0 | Status: SHIPPED | OUTPATIENT
Start: 2025-05-21 | End: 2025-05-28

## 2025-05-21 NOTE — PROGRESS NOTES
Subjective   Patient ID: Justin Carcamo is a 35 y.o. male who presents for Conjunctivitis.    Yellow green discharge from left eye   When he blows his nose- burning on left side   Eye is swollen and red   Itching and burning   Denies injury to eye   No pain with eye movement  No vision change  No fever        Review of Systems  ROS completely negative except what was mentioned in the HPI.  Problem List, surgical, social, and family histories which were reviewed and updated as necessary within the EMR. I also personally reviewed the notes, labs, and imaging that pertained to what was documented or discussed in the HPI.    Objective   Physical Exam  Vitals and nursing note reviewed.   Constitutional:       General: He is not in acute distress.     Appearance: Normal appearance. He is not ill-appearing.   HENT:      Head: Normocephalic and atraumatic.      Right Ear: External ear normal.      Left Ear: External ear normal.      Nose: Nose normal.      Mouth/Throat:      Mouth: Mucous membranes are moist.   Eyes:      Extraocular Movements: Extraocular movements intact.      Right eye: Normal extraocular motion and no nystagmus.      Left eye: Normal extraocular motion and no nystagmus.      Conjunctiva/sclera:      Left eye: Left conjunctiva is injected. Exudate present.      Pupils: Pupils are equal, round, and reactive to light.      Comments: Swollen upper and lower left eye lid with mild erythema   Cardiovascular:      Rate and Rhythm: Normal rate and regular rhythm.      Heart sounds: Normal heart sounds.   Pulmonary:      Effort: Pulmonary effort is normal.      Breath sounds: Normal breath sounds.   Musculoskeletal:         General: Normal range of motion.      Cervical back: Normal range of motion and neck supple.   Skin:     General: Skin is warm and dry.   Neurological:      General: No focal deficit present.      Mental Status: He is alert and oriented to person, place, and time. Mental status is at  "baseline.   Psychiatric:         Mood and Affect: Mood normal.         Behavior: Behavior normal.         Thought Content: Thought content normal.         Judgment: Judgment normal.         /69   Pulse 73   Temp 36.8 °C (98.2 °F) (Temporal)   Ht 1.803 m (5' 11\")   Wt 85.5 kg (188 lb 6.4 oz)   SpO2 96%   BMI 26.28 kg/m²     Assessment/Plan    Problem List Items Addressed This Visit       Asthma    Overview   Singular, alb, advair          Other Visit Diagnoses         Periorbital cellulitis of left eye    -  Primary    Discussed red flag orbital cellulitis symptoms.  Augmentin and Polytrim now.  If worsening in 24-48 hrs, needs to go to ED    Relevant Medications    amoxicillin-clavulanate (Augmentin) 875-125 mg tablet    polymyxin B sulf-trimethoprim (Polytrim) ophthalmic solution           "

## 2025-06-02 ENCOUNTER — OFFICE VISIT (OUTPATIENT)
Dept: PRIMARY CARE | Facility: CLINIC | Age: 35
End: 2025-06-02
Payer: MEDICAID

## 2025-06-02 VITALS
BODY MASS INDEX: 26.43 KG/M2 | HEIGHT: 71 IN | DIASTOLIC BLOOD PRESSURE: 66 MMHG | WEIGHT: 188.8 LBS | HEART RATE: 67 BPM | OXYGEN SATURATION: 96 % | TEMPERATURE: 97.9 F | SYSTOLIC BLOOD PRESSURE: 104 MMHG

## 2025-06-02 DIAGNOSIS — H10.32 ACUTE CONJUNCTIVITIS OF LEFT EYE, UNSPECIFIED ACUTE CONJUNCTIVITIS TYPE: Primary | ICD-10-CM

## 2025-06-02 PROCEDURE — 3008F BODY MASS INDEX DOCD: CPT | Performed by: NURSE PRACTITIONER

## 2025-06-02 PROCEDURE — 1036F TOBACCO NON-USER: CPT | Performed by: NURSE PRACTITIONER

## 2025-06-02 PROCEDURE — 99213 OFFICE O/P EST LOW 20 MIN: CPT | Performed by: NURSE PRACTITIONER

## 2025-06-02 RX ORDER — ERYTHROMYCIN 5 MG/G
OINTMENT OPHTHALMIC 4 TIMES DAILY
Qty: 3.5 G | Refills: 1 | Status: SHIPPED | OUTPATIENT
Start: 2025-06-02 | End: 2025-06-12

## 2025-06-02 NOTE — PROGRESS NOTES
"Problem List Items Addressed This Visit    None  Visit Diagnoses         Acute conjunctivitis of left eye, unspecified acute conjunctivitis type    -  Primary    was treated with polytrim and augmentin (cellulitis)  will switch to erythromycin ointment and extend tx to 10 days  flonase  prn fu IO  may need ophth exam    Relevant Medications    erythromycin (Romycin) 5 mg/gram (0.5 %) ophthalmic ointment             Subjective   Patient ID: Justin Carcamo is a 35 y.o. male who presents for possible pink eye (possible pink eye had it and came back in left eye finished meds and came back started 2 days ago).  HPI  Saw deb 5/21/25:     Periorbital cellulitis of left eye    -  Primary   Discussed red flag orbital cellulitis symptoms.  Augmentin and Polytrim now.  If worsening in 24-48 hrs, needs to go to ED   Relevant Medications   amoxicillin-clavulanate (Augmentin) 875-125 mg tablet   polymyxin B sulf-trimethoprim (Polytrim) ophthalmic solution     Was doing well after augmentin and polytrim  2 days ago sx restarted   L eye feels like sandpaper  No fever  Nasal congestion  L max pressure  No vision changes  Yellow/green eye discharge    Review of Systems   All other systems reviewed and are negative.      BP Readings from Last 3 Encounters:   06/02/25 104/66   05/21/25 112/69   04/15/25 110/66      Wt Readings from Last 3 Encounters:   06/02/25 85.6 kg (188 lb 12.8 oz)   05/21/25 85.5 kg (188 lb 6.4 oz)   04/15/25 84.9 kg (187 lb 1.6 oz)      BMI:   Estimated body mass index is 26.33 kg/m² as calculated from the following:    Height as of this encounter: 1.803 m (5' 11\").    Weight as of this encounter: 85.6 kg (188 lb 12.8 oz).    Objective   Physical Exam  Constitutional:       General: He is not in acute distress.  HENT:      Head: Normocephalic and atraumatic.      Right Ear: Tympanic membrane and external ear normal.      Left Ear: Tympanic membrane and external ear normal.      Nose: Nose normal.      " Mouth/Throat:      Mouth: Mucous membranes are moist.   Eyes:      Extraocular Movements: Extraocular movements intact.      Conjunctiva/sclera: Conjunctivae normal.      Comments: Small amount yellow discharge R medial. L injection    Neck:      Vascular: No carotid bruit.   Cardiovascular:      Rate and Rhythm: Normal rate and regular rhythm.      Pulses: Normal pulses.   Pulmonary:      Effort: Pulmonary effort is normal.      Breath sounds: Normal breath sounds.   Musculoskeletal:         General: Normal range of motion.      Cervical back: Normal range of motion and neck supple.   Lymphadenopathy:      Cervical: No cervical adenopathy.   Skin:     General: Skin is warm and dry.   Neurological:      General: No focal deficit present.      Mental Status: He is alert.   Psychiatric:         Mood and Affect: Mood normal.

## 2025-06-03 ENCOUNTER — APPOINTMENT (OUTPATIENT)
Dept: PRIMARY CARE | Facility: CLINIC | Age: 35
End: 2025-06-03
Payer: MEDICAID

## 2025-06-04 ENCOUNTER — TELEPHONE (OUTPATIENT)
Dept: PRIMARY CARE | Facility: CLINIC | Age: 35
End: 2025-06-04
Payer: MEDICAID

## 2025-06-04 NOTE — TELEPHONE ENCOUNTER
FYI  Patient called in with an update regarding his previous appointment stated his eyes are slightly better, still red and itchy but improving.   Stated sore throat still consistent bu toverall doing better

## 2025-06-16 ENCOUNTER — OFFICE VISIT (OUTPATIENT)
Dept: PRIMARY CARE | Facility: CLINIC | Age: 35
End: 2025-06-16
Payer: MEDICAID

## 2025-06-16 VITALS
WEIGHT: 184.4 LBS | BODY MASS INDEX: 25.81 KG/M2 | DIASTOLIC BLOOD PRESSURE: 66 MMHG | SYSTOLIC BLOOD PRESSURE: 110 MMHG | TEMPERATURE: 98.4 F | HEIGHT: 71 IN | OXYGEN SATURATION: 97 % | HEART RATE: 82 BPM

## 2025-06-16 DIAGNOSIS — J35.1 TONSILLAR HYPERTROPHY, UNILATERAL: ICD-10-CM

## 2025-06-16 DIAGNOSIS — H57.9 ITCHY EYES: ICD-10-CM

## 2025-06-16 DIAGNOSIS — J02.9 PHARYNGITIS, UNSPECIFIED ETIOLOGY: Primary | ICD-10-CM

## 2025-06-16 PROCEDURE — 99214 OFFICE O/P EST MOD 30 MIN: CPT | Performed by: NURSE PRACTITIONER

## 2025-06-16 PROCEDURE — 3008F BODY MASS INDEX DOCD: CPT | Performed by: NURSE PRACTITIONER

## 2025-06-16 PROCEDURE — 1036F TOBACCO NON-USER: CPT | Performed by: NURSE PRACTITIONER

## 2025-06-16 RX ORDER — POLYMYXIN B SULFATE AND TRIMETHOPRIM 1; 10000 MG/ML; [USP'U]/ML
1 SOLUTION OPHTHALMIC
COMMUNITY
Start: 2025-05-21 | End: 2025-05-28

## 2025-06-16 RX ORDER — OLOPATADINE HYDROCHLORIDE 1 MG/ML
1 SOLUTION OPHTHALMIC 2 TIMES DAILY PRN
Qty: 5 ML | Refills: 0 | Status: SHIPPED | OUTPATIENT
Start: 2025-06-16 | End: 2025-10-14

## 2025-06-16 ASSESSMENT — PATIENT HEALTH QUESTIONNAIRE - PHQ9
SUM OF ALL RESPONSES TO PHQ9 QUESTIONS 1 AND 2: 0
2. FEELING DOWN, DEPRESSED OR HOPELESS: NOT AT ALL
1. LITTLE INTEREST OR PLEASURE IN DOING THINGS: NOT AT ALL

## 2025-06-16 NOTE — PROGRESS NOTES
Problem List Items Addressed This Visit    None  Visit Diagnoses         Pharyngitis, unspecified etiology    -  Primary    since Mid-May  has been treated with abx  ? PND  ? tonsilar hypertrophy underlying  - get CT   check labs   also sent cx  may need ENT    Relevant Medications    olopatadine (Patanol) 0.1 % ophthalmic solution    Other Relevant Orders    CT soft tissue neck w IV contrast    CBC and Auto Differential    Comprehensive Metabolic Panel    Sedimentation Rate    C-reactive protein    QUEST CULTURE, AEROBIC AND ANAEROBIC W/GRAM STAIN    Group A Streptococcus, Culture      Tonsillar hypertrophy, unilateral        ? acute over chronic  ? abscess  ? lymphs  CT soft tissue head/neck to further assess    Relevant Medications    olopatadine (Patanol) 0.1 % ophthalmic solution    Other Relevant Orders    CT soft tissue neck w IV contrast    CBC and Auto Differential    Comprehensive Metabolic Panel    Sedimentation Rate    C-reactive protein    [facility] CULTURE, AEROBIC AND ANAEROBIC W/GRAM STAIN    Group A Streptococcus, Culture      Itchy eyes        try pataday    Relevant Medications    olopatadine (Patanol) 0.1 % ophthalmic solution             Subjective   Patient ID: Justin Carcamo is a 35 y.o. male who presents for pink eye / sore throat (seen 10 days ago for possible pink eye . sx came back plus sore throat/Finished drops Friday Sunday eye swollen green discharge sore throat has not gone away ).  HPI  Sore throat x 2-3 weeks  He did have sore throat when he first saw Cara 5/21  Was treated markell orbital cellulitis 5/21/25 Cara:    Periorbital cellulitis of left eye    -  Primary      Discussed red flag orbital cellulitis symptoms.  Augmentin and Polytrim now.  If worsening in 24-48 hrs, needs to go to ED     Relevant Medications     amoxicillin-clavulanate (Augmentin) 875-125 mg tablet     polymyxin B sulf-trimethoprim (Polytrim) ophthalmic solution    Then I saw him 6/2/25   Acute conjunctivitis of  "left eye, unspecified acute conjunctivitis type    -  Primary      was treated with polytrim and augmentin (cellulitis)  will switch to erythromycin ointment and extend tx to 10 days  flonase  prn fu IO  may need ophth exam     Relevant Medications     erythromycin (Romycin) 5 mg/gram (0.5 %) ophthalmic ointment       Left side of throat is painful   Only hurts when he swallows  No hemoptysis  No fever  No n/v  No dysphagia  This tonsil has been enlarged x ~ 10 y  - has had tonsil stones   - increased stones over past year  Using flonase for pnd     Review of Systems   All other systems reviewed and are negative.      BP Readings from Last 3 Encounters:   06/16/25 110/66   06/02/25 104/66   05/21/25 112/69      Wt Readings from Last 3 Encounters:   06/16/25 83.6 kg (184 lb 6.4 oz)   06/02/25 85.6 kg (188 lb 12.8 oz)   05/21/25 85.5 kg (188 lb 6.4 oz)      BMI:   Estimated body mass index is 25.72 kg/m² as calculated from the following:    Height as of this encounter: 1.803 m (5' 11\").    Weight as of this encounter: 83.6 kg (184 lb 6.4 oz).    Objective   Physical Exam  Constitutional:       General: He is not in acute distress.  HENT:      Head: Normocephalic and atraumatic.      Right Ear: Tympanic membrane and external ear normal.      Left Ear: Tympanic membrane and external ear normal.      Nose: Nose normal.      Mouth/Throat:      Mouth: Mucous membranes are moist.      Comments: L tonsilar hypertrophy. Erythematous wo exudate   Eyes:      Extraocular Movements: Extraocular movements intact.      Conjunctiva/sclera: Conjunctivae normal.   Neck:      Thyroid: No thyroid mass or thyroid tenderness.      Vascular: No carotid bruit.   Cardiovascular:      Rate and Rhythm: Normal rate and regular rhythm.      Pulses: Normal pulses.   Pulmonary:      Effort: Pulmonary effort is normal.      Breath sounds: Normal breath sounds.   Musculoskeletal:         General: Normal range of motion.      Cervical back: Normal " range of motion and neck supple.   Lymphadenopathy:      Cervical: No cervical adenopathy.      Right cervical: No superficial, deep or posterior cervical adenopathy.     Left cervical: No superficial, deep or posterior cervical adenopathy.   Skin:     General: Skin is warm and dry.   Neurological:      General: No focal deficit present.      Mental Status: He is alert.   Psychiatric:         Mood and Affect: Mood normal.

## 2025-06-17 LAB
ALBUMIN SERPL-MCNC: 5 G/DL (ref 3.6–5.1)
ALP SERPL-CCNC: 69 U/L (ref 36–130)
ALT SERPL-CCNC: 9 U/L (ref 9–46)
ANION GAP SERPL CALCULATED.4IONS-SCNC: 10 MMOL/L (CALC) (ref 7–17)
AST SERPL-CCNC: 17 U/L (ref 10–40)
BASOPHILS # BLD AUTO: 92 CELLS/UL (ref 0–200)
BASOPHILS NFR BLD AUTO: 1.3 %
BILIRUB SERPL-MCNC: 0.9 MG/DL (ref 0.2–1.2)
BUN SERPL-MCNC: 10 MG/DL (ref 7–25)
CALCIUM SERPL-MCNC: 10 MG/DL (ref 8.6–10.3)
CHLORIDE SERPL-SCNC: 102 MMOL/L (ref 98–110)
CO2 SERPL-SCNC: 29 MMOL/L (ref 20–32)
CREAT SERPL-MCNC: 0.91 MG/DL (ref 0.6–1.26)
CRP SERPL-MCNC: <3 MG/L
EGFRCR SERPLBLD CKD-EPI 2021: 111 ML/MIN/1.73M2
EOSINOPHIL # BLD AUTO: 405 CELLS/UL (ref 15–500)
EOSINOPHIL NFR BLD AUTO: 5.7 %
ERYTHROCYTE [DISTWIDTH] IN BLOOD BY AUTOMATED COUNT: 13.3 % (ref 11–15)
ERYTHROCYTE [SEDIMENTATION RATE] IN BLOOD BY WESTERGREN METHOD: 2 MM/H
GLUCOSE SERPL-MCNC: 82 MG/DL (ref 65–99)
HCT VFR BLD AUTO: 47.2 % (ref 38.5–50)
HGB BLD-MCNC: 14.7 G/DL (ref 13.2–17.1)
LYMPHOCYTES # BLD AUTO: 2002 CELLS/UL (ref 850–3900)
LYMPHOCYTES NFR BLD AUTO: 28.2 %
MCH RBC QN AUTO: 26.5 PG (ref 27–33)
MCHC RBC AUTO-ENTMCNC: 31.1 G/DL (ref 32–36)
MCV RBC AUTO: 85.2 FL (ref 80–100)
MONOCYTES # BLD AUTO: 454 CELLS/UL (ref 200–950)
MONOCYTES NFR BLD AUTO: 6.4 %
NEUTROPHILS # BLD AUTO: 4146 CELLS/UL (ref 1500–7800)
NEUTROPHILS NFR BLD AUTO: 58.4 %
PLATELET # BLD AUTO: 291 THOUSAND/UL (ref 140–400)
PMV BLD REES-ECKER: 11.6 FL (ref 7.5–12.5)
POTASSIUM SERPL-SCNC: 4.3 MMOL/L (ref 3.5–5.3)
PROT SERPL-MCNC: 7.6 G/DL (ref 6.1–8.1)
RBC # BLD AUTO: 5.54 MILLION/UL (ref 4.2–5.8)
SODIUM SERPL-SCNC: 141 MMOL/L (ref 135–146)
WBC # BLD AUTO: 7.1 THOUSAND/UL (ref 3.8–10.8)

## 2025-06-18 ENCOUNTER — TELEPHONE (OUTPATIENT)
Dept: PRIMARY CARE | Facility: CLINIC | Age: 35
End: 2025-06-18
Payer: MEDICAID

## 2025-06-18 LAB — S PYO THROAT QL CULT: NORMAL

## 2025-06-18 NOTE — TELEPHONE ENCOUNTER
Pt called in inquiring if he could restart his abx drops fro his eyes . His eyes seem to be getting worse . He woke up today and both eyes are extremely red. They were sealed shut with green / yellow discharge and they are extremely itchy / burn. He stated he does have his CT scheduled for Monday.         Please advise      Pt stated that if he does not answer that it is ok to leave detailed Vm .

## 2025-06-20 DIAGNOSIS — J35.1 TONSILLAR HYPERTROPHY, UNILATERAL: ICD-10-CM

## 2025-06-20 DIAGNOSIS — B95.5 BETA HEMOLYTIC STREPTOCOCCUS CULTURE POSITIVE: Primary | ICD-10-CM

## 2025-06-20 LAB
BACTERIA SPEC AEROBE CULT: NORMAL
BACTERIA SPEC ANAEROBE CULT: NORMAL
BACTERIA THROAT CULT: NORMAL

## 2025-06-20 RX ORDER — AMOXICILLIN 500 MG/1
500 CAPSULE ORAL EVERY 12 HOURS SCHEDULED
Qty: 20 CAPSULE | Refills: 0 | Status: SHIPPED | OUTPATIENT
Start: 2025-06-20 | End: 2025-06-30

## 2025-06-23 ENCOUNTER — HOSPITAL ENCOUNTER (OUTPATIENT)
Dept: RADIOLOGY | Facility: CLINIC | Age: 35
Discharge: HOME | End: 2025-06-23
Payer: MEDICAID

## 2025-06-23 DIAGNOSIS — J02.9 PHARYNGITIS, UNSPECIFIED ETIOLOGY: ICD-10-CM

## 2025-06-23 DIAGNOSIS — J35.1 TONSILLAR HYPERTROPHY, UNILATERAL: ICD-10-CM

## 2025-06-23 PROCEDURE — 70491 CT SOFT TISSUE NECK W/DYE: CPT

## 2025-06-23 PROCEDURE — 2550000001 HC RX 255 CONTRASTS: Performed by: NURSE PRACTITIONER

## 2025-06-23 PROCEDURE — 70491 CT SOFT TISSUE NECK W/DYE: CPT | Performed by: RADIOLOGY

## 2025-06-23 RX ADMIN — IOHEXOL 50 ML: 350 INJECTION, SOLUTION INTRAVENOUS at 11:21

## 2025-06-25 PROBLEM — J35.1 TONSILLAR HYPERTROPHY: Status: ACTIVE | Noted: 2025-06-25

## 2025-07-08 ENCOUNTER — HOSPITAL ENCOUNTER (OUTPATIENT)
Dept: RADIOLOGY | Facility: CLINIC | Age: 35
Discharge: HOME | End: 2025-07-08
Payer: MEDICAID

## 2025-07-08 ENCOUNTER — APPOINTMENT (OUTPATIENT)
Dept: PRIMARY CARE | Facility: CLINIC | Age: 35
End: 2025-07-08
Payer: MEDICAID

## 2025-07-08 VITALS
SYSTOLIC BLOOD PRESSURE: 122 MMHG | BODY MASS INDEX: 25.76 KG/M2 | DIASTOLIC BLOOD PRESSURE: 74 MMHG | WEIGHT: 184 LBS | OXYGEN SATURATION: 96 % | HEIGHT: 71 IN | HEART RATE: 77 BPM | TEMPERATURE: 98.5 F

## 2025-07-08 DIAGNOSIS — Z00.00 WELL ADULT EXAM: Primary | ICD-10-CM

## 2025-07-08 DIAGNOSIS — M79.672 LEFT FOOT PAIN: ICD-10-CM

## 2025-07-08 DIAGNOSIS — E55.9 VITAMIN D DEFICIENCY: ICD-10-CM

## 2025-07-08 DIAGNOSIS — J35.1 TONSILLAR HYPERTROPHY, UNILATERAL: ICD-10-CM

## 2025-07-08 PROCEDURE — 99395 PREV VISIT EST AGE 18-39: CPT | Performed by: INTERNAL MEDICINE

## 2025-07-08 PROCEDURE — 73630 X-RAY EXAM OF FOOT: CPT | Mod: LT

## 2025-07-08 PROCEDURE — 3008F BODY MASS INDEX DOCD: CPT | Performed by: INTERNAL MEDICINE

## 2025-07-08 PROCEDURE — 1036F TOBACCO NON-USER: CPT | Performed by: INTERNAL MEDICINE

## 2025-07-08 PROCEDURE — 73630 X-RAY EXAM OF FOOT: CPT | Mod: LEFT SIDE | Performed by: RADIOLOGY

## 2025-07-08 NOTE — PROGRESS NOTES
"  Over the past 2 weeks, how often have you been bothered by any of the following problems?     Unable to screen due to: --   Little interest or pleasure in doing things Not at all   Feeling down, depressed, or hopeless Not at all   Patient Health Questionnaire-2 Score 0   Subjective       Current Issues:  Current concerns include has fu w ent  May need tonsillectomy  Lump on foot one year  .used to run marathons  Foot pain one year  2018 stopped marathons    Sleep:  not great w kids    No bowel or bladder issues  No cp or sob or depression    Review of Nutrition:  Current diet: pretty good, has to remember to eat  Exercise discussed    Gen:  no fever  HEENT:  no trouble swallowing  CV:  no dyspnea, cyanosis  Lungs:  no shortness of breath  GI:  no constipation, no blood in stool  Vascular:  no edema  Neuro:   no weakness  Skin:  no rash  MS:no joint swelling  Gu:  no urinary complaints  All other systems have been reviewed and are negative for complaint      Depression Screen  (Note: if answer to either of the following is \"Yes\", then a more complete depression screening is indicated)   Q1: Over the past two weeks, have you felt down, depressed or hopeless? No  Q2: Over the past two weeks, have you felt little interest or pleasure in doing things? No    Screening Questions:  Objective   /74   Pulse 77   Temp 36.9 °C (98.5 °F)   Ht 1.803 m (5' 11\")   Wt 83.5 kg (184 lb)   SpO2 96%   BMI 25.66 kg/m²       General:   alert and oriented, in no acute distress   Gait:   normal   Skin:   normal   Oral cavity:   mild l tonsil larger lips, mucosa, and tongue normal; teeth and gums normal   Eyes:   sclerae white, pupils equal and reactive   Ears:   normal bilaterally Tms grey   Neck:   no adenopathy and thyroid not enlarged, symmetric, no tenderness/mass/nodules   Lungs:  clear to auscultation bilaterally   Heart:   regular rate and rhythm, S1, S2 normal, no murmur, click, rub or gallop   Abdomen:  soft, " non-tender; bowel sounds normal; no masses, no organomegaly   : Ne no issues per pt        Extremities:  extremities normal, warm and well-perfused; no cyanosis, clubbing, or edema,   Neuro:  normal without focal findings and muscle tone and strength normal and symmetric     Left foot? Cyst ?  Justin was seen today for annual exam.  Diagnoses and all orders for this visit:  Well adult exam (Primary)  -     Lipid Panel; Future  -     Vitamin B12; Future  -     Lipid Panel  -     Vitamin B12  Left foot pain  -     XR foot left 3+ views; Future  Tonsillar hypertrophy, unilateral  Vitamin D deficiency  -     Vitamin D 25-Hydroxy,Total (for eval of Vitamin D levels); Future  -     Vitamin D 25-Hydroxy,Total (for eval of Vitamin D levels)     Use amilactin on top of foot short term  May need podiatry p xray   Chronic conditions reviewed in the assessment and plan.    Continue medications unless specified otherwise.  Previous labs reviewed.   Other specialty provider notes reviewed.   Fu in one year for well care and as otherwise stated in wrap up plans.

## 2025-07-09 ENCOUNTER — APPOINTMENT (OUTPATIENT)
Facility: CLINIC | Age: 35
End: 2025-07-09
Payer: MEDICAID

## 2025-07-09 VITALS — WEIGHT: 184 LBS | BODY MASS INDEX: 25.76 KG/M2 | HEIGHT: 71 IN

## 2025-07-09 DIAGNOSIS — J35.8 TONSIL STONE: Primary | ICD-10-CM

## 2025-07-09 DIAGNOSIS — J35.1 TONSILLAR HYPERTROPHY, UNILATERAL: ICD-10-CM

## 2025-07-09 PROCEDURE — 1036F TOBACCO NON-USER: CPT | Performed by: OTOLARYNGOLOGY

## 2025-07-09 PROCEDURE — 3008F BODY MASS INDEX DOCD: CPT | Performed by: OTOLARYNGOLOGY

## 2025-07-09 PROCEDURE — 99204 OFFICE O/P NEW MOD 45 MIN: CPT | Performed by: OTOLARYNGOLOGY

## 2025-07-09 NOTE — PROGRESS NOTES
Impression:  1. Tonsil stone        2. Tonsillar hypertrophy, unilateral  Referral to ENT           RECOMMENDATIONS/PLAN :  I explained to the patient that his left tonsil is slightly larger than the right however it is not extremely enlarged and there is no evidence of any ulceration bleeding or any obvious mass.  I want him to continue to monitor that every few months and he will let me know if he notices any significant change.  I want him to drink plenty of water when he is eating and he will gargle with salt water when he is done eating and hopefully this will eliminate future tonsil stones.  He can otherwise follow-up as needed.      **This electronic medical record note was created with the use of voice recognition software.  Despite proofreading, typographical or grammatical errors may be present that could affect meaning of content **    Subjective   Patient ID:     Justin Carcamo is a 35 y.o. male who presents to the office today as an evaluation of his tonsils.  He has noticed that over the last several years, his left tonsil is slightly larger than the right.  He has had only 1 episode of tonsillitis.  He denies frequent sore throat or any secondary symptoms such as unexplained loss of weight night sweats or fatigue.  He denies any bleeding from the tonsils.  He has had a few tonsil stones from that left tonsil.  No recent fever or chills.  No hemoptysis or hematemesis.  The patient does not smoke cigarettes.    ROS:  A detailed 12 system review of systems is noted on the intake form has been reviewed with the patient with details noted in the HPI and scanned into the patient's medical record.    Objective     Medical History[1]     Surgical History[2]     RX Allergies[3]     Current Medications[4]     Tobacco Use: Low Risk  (7/9/2025)    Patient History     Smoking Tobacco Use: Never     Smokeless Tobacco Use: Never     Passive Exposure: Not on file        Alcohol Use: Not on file        Social  "History     Substance and Sexual Activity   Drug Use Yes    Types: Marijuana    Comment: medical marijuana        Physical Exam:  Visit Vitals  Ht 1.803 m (5' 11\")   Wt 83.5 kg (184 lb)   BMI 25.66 kg/m²   Smoking Status Never   BSA 2.04 m²      General: Patient is alert, oriented, cooperative in no apparent distress.  Head: Normocephalic, atraumatic.  Eyes: PERRL, EOMI, Conjunctiva is clear. No nystagmus.  Ears: Right Ear-- Pinna is normal.  External auditory canal is patent. Tympanic membrane is [intact, translucent and has good mobility with my pneumatic otoscope. No effusion].  Mastoid is nontender.  Left ear-- Pinna is normal.  External auditory canal is patent. Tympanic membrane is [intact, translucent and has good mobility with my pneumatic otoscope.  No effusion].  Mastoid is nontender.  Nose: Septum is relatively straight.  No septal perforation or lesions. No septal hematoma/ seroma.  No signs of bleeding.  Inferior turbinates are mildly swollen.   No evidence of intranasal polyps.  No infectious drainage.  Throat:  Floor of mouth is clear, no masses.  Tongue appears normal, no lesions or masses. Gums, gingiva, buccal mucosa appear pink and moist, no lesions. Teeth are in good repair.  No obvious dental infections.  Peritonsillar regions appear symmetric without swelling.  Hard and soft palate appear normal, no obvious cleft. Uvula is midline.  Left Tonsil --+1.5, no exudates.  No visible tonsil stones  Right Tonsil --+1, no exudates.  No visible tonsil stones  Oropharynx: No lesions. Retropharyngeal wall is flat.  No active postnasal drip.  Neck: Supple,  no lymphadenopathy.  No masses.  Salivary Glands: Symmetric bilaterally.  No palpable masses.  No evidence of acute infection or salivary stones  Neurologic: Cranial Nerves 2-12 are grossly intact without focal deficits. Cerebellar function testing is normal.     Results:   []    Procedure:   []    Elie Gonzalez DO        [1]   Past Medical " History:  Diagnosis Date    Other specified health status     No pertinent past medical history    Personal history of other medical treatment     H/O CT scan    Personal history of other medical treatment     H/O CT scan of chest   [2]   Past Surgical History:  Procedure Laterality Date    OTHER SURGICAL HISTORY  10/03/2018    Bronchoscopy (Diagnostic)    OTHER SURGICAL HISTORY  10/17/2022    Colonoscopy complete for polypectomy    UMBILICAL HERNIA REPAIR N/A 11/08/2022   [3]   Allergies  Allergen Reactions    Escitalopram Oxalate Other     Didn't feel well     Paroxetine Other     Dark thoughts    Sertraline Other     Dark thoughts    [4]   Current Outpatient Medications:     albuterol 90 mcg/actuation inhaler, INHALE 1 TO 2 PUFFS BY MOUTH EVERY 4 TO 6 HOURS AS NEEDED, Disp: 18 g, Rfl: 3    cholecalciferol (Vitamin D-3) 50 mcg (2,000 unit) capsule, Take 1 capsule (50 mcg) by mouth early in the morning.., Disp: , Rfl:     fluticasone propion-salmeteroL (Advair Diskus) 100-50 mcg/dose diskus inhaler, INHALE 1 PUFF BY MOUTH TWICE A DAY, Disp: 180 each, Rfl: 3    montelukast (Singulair) 10 mg tablet, TAKE 1 TABLET BY MOUTH EVERY DAY, Disp: 90 tablet, Rfl: 3

## 2025-07-10 DIAGNOSIS — M79.672 LEFT FOOT PAIN: ICD-10-CM

## 2025-07-13 ENCOUNTER — OFFICE VISIT (OUTPATIENT)
Dept: URGENT CARE | Age: 35
End: 2025-07-13

## 2025-07-13 VITALS
DIASTOLIC BLOOD PRESSURE: 69 MMHG | HEART RATE: 95 BPM | RESPIRATION RATE: 15 BRPM | BODY MASS INDEX: 25.66 KG/M2 | OXYGEN SATURATION: 99 % | TEMPERATURE: 97 F | WEIGHT: 184 LBS | SYSTOLIC BLOOD PRESSURE: 106 MMHG

## 2025-07-13 DIAGNOSIS — M54.10 RADICULOPATHY OF ARM: ICD-10-CM

## 2025-07-13 DIAGNOSIS — M25.511 ACUTE PAIN OF RIGHT SHOULDER: Primary | ICD-10-CM

## 2025-07-13 DIAGNOSIS — R55 VASOVAGAL REACTION: ICD-10-CM

## 2025-07-13 PROCEDURE — 99204 OFFICE O/P NEW MOD 45 MIN: CPT | Performed by: NURSE PRACTITIONER

## 2025-07-13 PROCEDURE — 1036F TOBACCO NON-USER: CPT | Performed by: NURSE PRACTITIONER

## 2025-07-13 PROCEDURE — 96372 THER/PROPH/DIAG INJ SC/IM: CPT | Performed by: NURSE PRACTITIONER

## 2025-07-13 RX ORDER — ONDANSETRON 4 MG/1
4 TABLET, ORALLY DISINTEGRATING ORAL ONCE
Status: SHIPPED | OUTPATIENT
Start: 2025-07-13

## 2025-07-13 RX ORDER — KETOROLAC TROMETHAMINE 10 MG/1
10 TABLET, FILM COATED ORAL EVERY 8 HOURS PRN
Qty: 15 TABLET | Refills: 0 | Status: SHIPPED | OUTPATIENT
Start: 2025-07-13 | End: 2025-07-18

## 2025-07-13 RX ORDER — KETOROLAC TROMETHAMINE 30 MG/ML
30 INJECTION, SOLUTION INTRAMUSCULAR; INTRAVENOUS ONCE
Status: DISCONTINUED | OUTPATIENT
Start: 2025-07-13 | End: 2025-07-13

## 2025-07-13 RX ORDER — KETOROLAC TROMETHAMINE 30 MG/ML
30 INJECTION, SOLUTION INTRAMUSCULAR; INTRAVENOUS ONCE
Status: COMPLETED | OUTPATIENT
Start: 2025-07-13 | End: 2025-07-13

## 2025-07-13 RX ORDER — PREDNISONE 20 MG/1
20 TABLET ORAL DAILY
Qty: 5 TABLET | Refills: 0 | Status: SHIPPED | OUTPATIENT
Start: 2025-07-13 | End: 2025-07-18

## 2025-07-13 RX ORDER — BACLOFEN 10 MG/1
10 TABLET ORAL 3 TIMES DAILY PRN
Qty: 15 TABLET | Refills: 0 | Status: SHIPPED | OUTPATIENT
Start: 2025-07-13 | End: 2026-01-09

## 2025-07-13 RX ADMIN — KETOROLAC TROMETHAMINE 30 MG: 30 INJECTION, SOLUTION INTRAMUSCULAR; INTRAVENOUS at 10:17

## 2025-07-13 RX ADMIN — KETOROLAC TROMETHAMINE 30 MG: 30 INJECTION, SOLUTION INTRAMUSCULAR; INTRAVENOUS at 10:18

## 2025-07-13 NOTE — PATIENT INSTRUCTIONS
Shoulder Pain/Radiculopathy:  - Toradol IM given in the UC today for pain  - Take medications prescribed as instructed; next dose of Ketorolac (Toradol) can be taken around 5pm this evening; discussed medications with patient  - Heat and Ice  - Shoulder exercises as discussed  - See printouts    Vasovagal Response:   - See Printout  - Secondary to IM injection  - Zofran given for nausea  - Stabilized prior to driving home

## 2025-07-13 NOTE — PROGRESS NOTES
Subjective   Patient ID: Justin Carcamo is a 35 y.o. male. They present today with a chief complaint of Other (Right shoulder pain since thrusday. No known injury, woke with the pain, he states when moves his neck to the left, , the pain in his right arm worsens. ).    History of Present Illness  Pt presents to the  with the c/o right shoulder pain. Pt states he woke up Thursday morning with pain in the right shoulder, worsens with moving neck to the left outward extension. No pain against resistance. Denies injury. He is a musician. No numbness or tingling. Pain will radiate down the arm with with movement of neck and outward extension. No other associated symptoms or concerns to address at this time. Pt states that he took a Motrin and it helped but very little.     Post IM Injection:  Pt became faint, pale, diaphoretic and nauseated, per patient this often happens when he gets injections. Pt placed himself down on the floor in supine position. Oral zofran given, Blood pressure down to 94/62 with HR at 62. Pt continued to be monitored by staff at his side. Pt did not pass out. Given ice pop. BP prior to discharge 119/80 with HR at 66          Past Medical History  Allergies as of 07/13/2025 - Reviewed 07/13/2025   Allergen Reaction Noted    Escitalopram oxalate Other 04/06/2023    Paroxetine Other 04/06/2023    Sertraline Other 04/06/2023       Prescriptions Prior to Admission[1]     Medical History[2]    Surgical History[3]     reports that he has never smoked. He has never used smokeless tobacco. He reports that he does not currently use alcohol. He reports current drug use. Drug: Marijuana.    Review of Systems  Review of Systems  10 point ROS completed and all are negative other than what is stated in the current HPI                               Objective    Vitals:    07/13/25 0947   BP: 106/69   Pulse: 95   Resp: 15   Temp: 36.1 °C (97 °F)   SpO2: 99%   Weight: 83.5 kg (184 lb)     No LMP for male  patient.    Physical Exam  Vitals and nursing note reviewed.   Constitutional:       Appearance: Normal appearance.   HENT:      Head: Normocephalic and atraumatic.   Cardiovascular:      Rate and Rhythm: Normal rate and regular rhythm.   Pulmonary:      Effort: Pulmonary effort is normal.      Breath sounds: Normal breath sounds.   Musculoskeletal:         General: Tenderness present. No signs of injury.      Right shoulder: Tenderness present. No swelling, deformity, effusion, laceration, bony tenderness or crepitus. Normal range of motion. Normal strength. Normal pulse.      Left shoulder: Normal.        Arms:       Comments: Movement of neck to the left causes pain to radiate down the right arm  Tenderness along the scapula and to anterior shoulder on palpation  Spasm noted to trapezius muscle   Skin:     General: Skin is warm and dry.      Capillary Refill: Capillary refill takes less than 2 seconds.   Neurological:      Mental Status: He is alert and oriented to person, place, and time.   Psychiatric:         Behavior: Behavior normal.         Procedures    Point of Care Test & Imaging Results from this visit  No results found for this visit on 07/13/25.   Imaging  No results found.    Cardiology, Vascular, and Other Imaging  No other imaging results found for the past 2 days      Diagnostic study results (if any) were reviewed by ZURI Little.    Assessment/Plan   Allergies, medications, history, and pertinent labs/EKGs/Imaging reviewed by ZURI Little.     Medical Decision Making  Shoulder Pain/Radiculopathy:  - Toradol IM given in the UC today for pain  - Take medications prescribed as instructed; next dose of Ketorolac (Toradol) can be taken around 5pm this evening; discussed medications with patient  - Heat and Ice  - Shoulder exercises as discussed  - See printouts    Vasovagal Response:   - See Printout  - Secondary to IM injection  - Zofran given for nausea  -  Stabilized prior to driving home    At time of discharge patient was clinically well-appearing and HDS for outpatient management. The patient and/or family was educated regarding diagnosis, supportive care, OTC and Rx medications. The patient and/or family was given the opportunity to ask questions prior to discharge.  They verbalized understanding of my discussion of the plans for treatment, expected course, indications to return to  or seek further evaluation in ED, and the need for timely follow up as directed.   They were provided with a work/school excuse if requested.  AVS provided to patient.  All questions were answered and the patient verbalized understanding of the plan of care for today.       Orders and Diagnoses  There are no diagnoses linked to this encounter.    Medical Admin Record      Patient disposition: Home    Electronically signed by ZURI Little  9:56 AM           [1] (Not in a hospital admission)  [2]   Past Medical History:  Diagnosis Date    Other specified health status     No pertinent past medical history    Personal history of other medical treatment     H/O CT scan    Personal history of other medical treatment     H/O CT scan of chest   [3]   Past Surgical History:  Procedure Laterality Date    OTHER SURGICAL HISTORY  10/03/2018    Bronchoscopy (Diagnostic)    OTHER SURGICAL HISTORY  10/17/2022    Colonoscopy complete for polypectomy    UMBILICAL HERNIA REPAIR N/A 11/08/2022

## 2025-07-23 ENCOUNTER — APPOINTMENT (OUTPATIENT)
Dept: RADIOLOGY | Facility: CLINIC | Age: 35
End: 2025-07-23
Payer: MEDICAID

## 2025-07-23 DIAGNOSIS — M79.672 LEFT FOOT PAIN: ICD-10-CM

## 2025-07-23 PROCEDURE — A9575 INJ GADOTERATE MEGLUMI 0.1ML: HCPCS | Mod: JZ | Performed by: INTERNAL MEDICINE

## 2025-07-23 PROCEDURE — 73720 MRI LWR EXTREMITY W/O&W/DYE: CPT | Mod: LT

## 2025-07-23 PROCEDURE — 2550000001 HC RX 255 CONTRASTS: Mod: JZ | Performed by: INTERNAL MEDICINE

## 2025-07-23 PROCEDURE — 73720 MRI LWR EXTREMITY W/O&W/DYE: CPT | Mod: LEFT SIDE | Performed by: STUDENT IN AN ORGANIZED HEALTH CARE EDUCATION/TRAINING PROGRAM

## 2025-07-23 RX ORDER — GADOTERATE MEGLUMINE 376.9 MG/ML
17 INJECTION INTRAVENOUS
Status: COMPLETED | OUTPATIENT
Start: 2025-07-23 | End: 2025-07-23

## 2025-07-23 RX ADMIN — GADOTERATE MEGLUMINE 17 ML: 376.9 INJECTION INTRAVENOUS at 09:55

## 2025-07-24 DIAGNOSIS — M25.511 RIGHT SHOULDER PAIN, UNSPECIFIED CHRONICITY: Primary | ICD-10-CM

## 2025-07-30 NOTE — PROGRESS NOTES
History of Present Illness   Chief Complaint   Patient presents with    Right Shoulder - New Patient Visit, Pain     Seen in urgent care 7/13/25       The patient is here with a complaint of side: right shoulder pain.  The patient is side: right hand dominant.  They have had 3 weeks of shoulder pain.  The pain began no known event.  He states he woke up 1 morning with pain radiating down his right arm.  He was seen at a local urgent care and was treated with prednisone and a muscle relaxant and a shot of Toradol which caused him to pass out.  He states the prednisone kind of helped his pain but made it feel different but did not completely take it away.  He notes pain radiating down his right arm when looking to the right side.  He isolates most of his pain in his trapezius muscle however he does have tenderness to palpation over the long head of the biceps tendon  He also complains of some weakness in the right arm.    Medical History[1]    Medication Documentation Review Audit       Reviewed by Kiara Barrera MA (Medical Assistant) on 07/31/25 at 0822      Medication Order Taking? Sig Documenting Provider Last Dose Status   albuterol 90 mcg/actuation inhaler [id number]  INHALE 1 TO 2 PUFFS BY MOUTH EVERY 4 TO 6 HOURS AS NEEDED Yue Giles MD  Active   baclofen (Lioresal) 10 mg tablet [id number]  Take 1 tablet (10 mg) by mouth 3 times a day as needed for muscle spasms. ZURI Little  Active   cholecalciferol (Vitamin D-3) 50 mcg (2,000 unit) capsule [id number] No Take 1 capsule (50 mcg) by mouth early in the morning.. Historical Provider, MD Taking Active   fluticasone propion-salmeteroL (Advair Diskus) 100-50 mcg/dose diskus inhaler [id number]  INHALE 1 PUFF BY MOUTH TWICE A DAY ZURI Goodman  Active   montelukast (Singulair) 10 mg tablet [id number]  TAKE 1 TABLET BY MOUTH EVERY DAY ZURI Goodman  Active   ondansetron ODT (Zofran-ODT) disintegrating tablet 4 mg  [id number]   ZURI Little  Active   ondansetron ODT (Zofran-ODT) disintegrating tablet 4 mg [id number]   ZURI Little  Active                    RX Allergies[2]    Social History     Socioeconomic History    Marital status:      Spouse name: Not on file    Number of children: Not on file    Years of education: Not on file    Highest education level: Not on file   Occupational History    Not on file   Tobacco Use    Smoking status: Never    Smokeless tobacco: Never   Substance and Sexual Activity    Alcohol use: Not Currently    Drug use: Yes     Types: Marijuana     Comment: medical marijuana    Sexual activity: Not on file   Other Topics Concern    Not on file   Social History Narrative    Not on file     Social Drivers of Health     Financial Resource Strain: Not on file   Food Insecurity: Not on file   Transportation Needs: Not on file   Physical Activity: Not on file   Stress: Not on file   Social Connections: Not on file   Intimate Partner Violence: Not on file   Housing Stability: Not on file       Surgical History[3]      Location: Along the long head of the biceps and trapezius muscle  Pain level: 8  Description: aching, sore, stiff, and sharp  Pain with: sleeping on affected side and looking towards the right side  Night pain: pain at night.     Review of Systems   GENERAL: Negative  GI: Negative  MUSCULOSKELETAL: See HPI  SKIN: Negative  NEURO:  Negative     Physical Exam:    This is a 35-year-old male in no acute distress  Neck is supple nontender, Spurling's test is positive  side: right Shoulder:  There is no tenderness to palpation over the acromioclavicular joint.  He has tenderness to palpation over the long head of the biceps  Active range of motion: Forward elevation 180, internal rotation T12, external rotation 90 abduction 100  Jobes test positive for pain negative for weakness  External rotation test negative  Belly press test negative  Tremont's test  positive for pain negative for weakness  Lift off test negative  Elbow and wrist motion were not irritable.  Radial pulse 2+ and palpable.  When looking to the right side he states he gets pain that radiates all the way down his right arm to his fingertips  He has a positive Tinel's test at the cubital tunnel  He has 5/5 strength with shoulder abduction, elbow flexion, wrist extension, hand intrinsic function  He has 4/5 strength with elbow extension and wrist flexion     Imaging  XR shoulder right 2+ views  Interpreted By:  Antony Pitt,   STUDY:  XR SHOULDER RIGHT 2+ VIEWS; ; 7/31/2025 8:41 am      INDICATION:  Signs/Symptoms:pain.      ACCESSION NUMBER(S):  [id number]      ORDERING CLINICIAN:  ANTONY PITT      FINDINGS:  Right shoulder films are negative for fracture, dislocation or  destructive lesion. The acromioclavicular and glenohumeral joint  spaces are well maintained. No soft tissue abnormality is identified.          Signed by: Antony Pitt 7/31/2025 8:50 AM  Dictation workstation:   [id number]         Assessment   Cervical radiculopathy C7  Cubital tunnel  side: right Shoulder pain  Right long head of biceps tendinitis    Plan  Medrol dose pack   Physical therapy  Cervical spine MRI due to continued symptoms despite conservative management  Referral to spine team  All questions answered         [1]   Past Medical History:  Diagnosis Date    Other specified health status     No pertinent past medical history    Personal history of other medical treatment     H/O CT scan    Personal history of other medical treatment     H/O CT scan of chest   [2]   Allergies  Allergen Reactions    Escitalopram Oxalate Other     Didn't feel well     Paroxetine Other     Dark thoughts    Sertraline Other     Dark thoughts    [3]   Past Surgical History:  Procedure Laterality Date    OTHER SURGICAL HISTORY  10/03/2018    Bronchoscopy (Diagnostic)    OTHER SURGICAL HISTORY  10/17/2022    Colonoscopy complete for polypectomy     UMBILICAL HERNIA REPAIR N/A 11/08/2022

## 2025-07-31 ENCOUNTER — OFFICE VISIT (OUTPATIENT)
Dept: ORTHOPEDIC SURGERY | Facility: CLINIC | Age: 35
End: 2025-07-31
Payer: MEDICAID

## 2025-07-31 ENCOUNTER — HOSPITAL ENCOUNTER (OUTPATIENT)
Dept: RADIOLOGY | Facility: CLINIC | Age: 35
Discharge: HOME | End: 2025-07-31
Payer: MEDICAID

## 2025-07-31 DIAGNOSIS — M75.21 BICEPS TENDINITIS ON RIGHT: ICD-10-CM

## 2025-07-31 DIAGNOSIS — M54.12 CERVICAL RADICULOPATHY: Primary | ICD-10-CM

## 2025-07-31 DIAGNOSIS — M25.511 RIGHT SHOULDER PAIN, UNSPECIFIED CHRONICITY: ICD-10-CM

## 2025-07-31 DIAGNOSIS — G56.21 CUBITAL TUNNEL SYNDROME ON RIGHT: ICD-10-CM

## 2025-07-31 PROCEDURE — 73030 X-RAY EXAM OF SHOULDER: CPT | Mod: RIGHT SIDE | Performed by: ORTHOPAEDIC SURGERY

## 2025-07-31 PROCEDURE — 1036F TOBACCO NON-USER: CPT | Performed by: ORTHOPAEDIC SURGERY

## 2025-07-31 PROCEDURE — 99212 OFFICE O/P EST SF 10 MIN: CPT | Performed by: ORTHOPAEDIC SURGERY

## 2025-07-31 PROCEDURE — 99203 OFFICE O/P NEW LOW 30 MIN: CPT | Performed by: ORTHOPAEDIC SURGERY

## 2025-07-31 PROCEDURE — 73030 X-RAY EXAM OF SHOULDER: CPT | Mod: RT

## 2025-07-31 RX ORDER — METHYLPREDNISOLONE 4 MG/1
TABLET ORAL
Qty: 21 TABLET | Refills: 0 | Status: SHIPPED | OUTPATIENT
Start: 2025-07-31

## 2025-07-31 RX ORDER — CYCLOBENZAPRINE HCL 10 MG
10 TABLET ORAL 3 TIMES DAILY PRN
Qty: 30 TABLET | Refills: 0 | Status: SHIPPED | OUTPATIENT
Start: 2025-07-31 | End: 2025-08-10

## 2025-08-06 ENCOUNTER — EVALUATION (OUTPATIENT)
Dept: PHYSICAL THERAPY | Facility: CLINIC | Age: 35
End: 2025-08-06
Payer: MEDICAID

## 2025-08-06 DIAGNOSIS — G56.21 CUBITAL TUNNEL SYNDROME, RIGHT: ICD-10-CM

## 2025-08-06 DIAGNOSIS — M54.12 RADICULOPATHY, CERVICAL: Primary | ICD-10-CM

## 2025-08-06 DIAGNOSIS — M25.511 ACUTE PAIN OF RIGHT SHOULDER: ICD-10-CM

## 2025-08-06 DIAGNOSIS — M75.21 BICEPS TENDINITIS OF RIGHT UPPER EXTREMITY: ICD-10-CM

## 2025-08-06 PROCEDURE — 97161 PT EVAL LOW COMPLEX 20 MIN: CPT | Mod: GP | Performed by: PHYSICAL THERAPIST

## 2025-08-06 PROCEDURE — 97110 THERAPEUTIC EXERCISES: CPT | Mod: GP | Performed by: PHYSICAL THERAPIST

## 2025-08-06 NOTE — PROGRESS NOTES
Physical Therapy Evaluation and Treatment      Patient Name: Justin Carcamo  MRN: [id number]  Today's Date: 8/6/2025  Time Calculation  Start Time: 0230  Stop Time: 0320  Time Calculation (min): 50 min      PT Evaluation Time Entry  PT Evaluation (Low) Time Entry: 25  PT Therapeutic Procedures Time Entry  Therapeutic Exercise Time Entry: 15  Manual Therapy Time Entry: 5                   INSURANCE:  Visit number: 1  [facility] BO NO COPAY/ DED/ OOP  COVERAGE 100 AUTH IS REQ AFTER EVAL [id number]     Referring Provider: Edison Michael PA-C  Problem List[1]     ASSESSMENT:  PT Assessment Results: decreased knowledge of HEP, activity limitations, ADLs/IADLs/self care skills, flexibility, motor function/control/tone, pain, participation restrictions, range of motion/joint mobility, strength, posture, transfers, coordination, decreased knowledge of precautions.  Rehab Prognosis: Good  Evaluation/Treatment Tolerance: Patient tolerated treatment well  Stable and/or uncomplicated characteristics    Justin Carcamo is a 35 y.o. male presenting to the clinic with cervical radiculopathy affecting the R UE. Pt demonstrates decreased ROM and strength of the cervical spine and B shoulders causing pain and dysfunction with gripping, lifting, pushing, pulling, playing instruments, computer work, and reading. Pt was given and reviewed HEP including postural exercises. Pt will benefit from skilled PT in order to increase ROM and strength of the cervical spine and B shoulders so that the pt can perform ADLs without pain and return to PLOF.     PLAN:  Treatments/Interventions: gait training, traction, dry needling, edema control, education/instruction, home program, self care/home management, manual therapy, neuromuscular re-education, therapeutic activities, therapeutic exercises, modalities, therapeutic elastic taping.   PT Plan: Skilled PT  PT Frequency: 2 times per week  Duration: 10 visits  Onset Date: 07/10/25  Number of  Treatments Authorized: Requires Authorization  Rehab Potential: Good  Plan of Care Agreement: Patient    Goals -    STG - After about 6 weeks:  Pt will report less than a 4/10 pain at the worst.  Pt will be able to manage changes in intra-abdominal pressure with appropriate thoracic diaphragm and TA muscle activation during functional activities that cause symptoms.   Pt will increase by 1 MMT grade for B shoulders in order to aid with completion of ADLs.  Pt will report MDIC with Quick DASH score.  Pt will have AROM by 5-10 degrees for the cervical spine.     LTG - after about 12 weeks:  Pt will report less than a 0-2/10 pain at the worst.  Pt will have at least 4+/5 to 5/5 strength for B shoulders in order to aid with completion of ADLs.  Pt will have AROM to WFL for the cervical spine.   Pt will report 50% improvement with Quick DASH score.  Pt will be independent with progressed HEP.    CURRENT PROBLEM:   1. Radiculopathy, cervical        2. Cubital tunnel syndrome, right        3. Acute pain of right shoulder        4. Biceps tendinitis of right upper extremity            Subjective    General -    Pt reports that he woke up in horrible pain at the start of July and had to go to urgent care. Pt was in his shoulder, neck, and down to hand. Pt had a Toradol shot and was given a small steroid pack, which did not do anything. Pt followed up with the orthopedic and was given a second steroid tapered pack and a muscle relaxer, which his pain has started to improve. Pt now has lots of tightness and will get an electric shock pain down his R UE when he turns his head to the R.  MRI and  spine team appointment both scheduled for Monday.    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    Mechanism of Injury -    Insidious Onset    History of Current Episode - 7/10/25    Pain -    Pain Location: Cervical and R UE  Pain Best: 3/10  Pain Today: 3/10  Pain Worst: 9-10/10   Pain Type:  Constant, Achy/Dull, Sharp, Stiffness/Tightness, Electric Shock, Numbness, Tingling, Weakness    Pain Exacerbating Factors: gripping, lifting, pushing, pulling, playing instruments, computer work, and reading.  Pain Relieving Factors: Rest, Steroids, Muscle Relaxer  Difficulty Sleeping: Yes  Night Sweats, Loss of Appetite, Unexpected Weight-loss: No    Work -   Work Status: Full Time   Musician and     Home Living -    Pt lives with wife and 3 children.    Patient Stated Goals -    Reduce pain  Get back to playing all his instruments    PRECAUTIONS -    Level 1 Autistic    Prior Level of Function -    I with ADLs/IADLs    Objective     Cervical AROM (degrees) -  Cervical Flexion: 41 with pain  Cervical Extension: 35 with pain  R Cervical Side Bend: 18 with pain  L Cervical Side Bend: 32   R Cervical Rotation: 50 with pain  L Cervical Rotation: 53     Shoulder MMT (/5) -   R shoulder Flexion: 4   R shoulder Abduction: 4 with slight pain   R shoulder ER: 4+   R shoulder IR: 4+   R Middle trapezius: 4   R Lower trapezius: 4   L shoulder Flexion: 4+   L shoulder Abduction: 4+   L shoulder ER: 4+   L shoulder IR: 4+   L Middle trapezius: 4+  L Lower trapezius: 4    Palpation -  TTP R UT/levator, supraspinatus, biceps    Special Tests -  ULTT Median: positive right  ULTT Radial: negative bilateral  ULTT Ulnar: positive right    Posture -   Rounded Shoulders  Forward Head    Outcome Measures -   Other Measures  Disability of Arm Shoulder Hand (DASH): 37 (% Quick)     Treatment -   Evaluation -   Low (35381)    Therapeutic Exercise (82074) -     Seated Correct Posture: reviewed  Hook-lying Upper Neck Extension over towel: 5 min  Supine Cervical Retraction with Towel: 2 sec x5  Supine Isometric Shoulder Extension with Towel: 5 sec x5  Seated Scapular Retraction: reviewed  Log Roll: reviewed    OP Patient Education -   Access Code: [email or web address]  URL: [email or web address]  Date:  08/06/2025  Prepared by: Coco Katz    Exercises  - Seated Correct Posture   - Hooklying Upper Neck Extension   - 2 x daily  - Supine Cervical Retraction with Towel  - 1-2 x daily - 2 sets - 10 reps - 2 seconds hold  - Supine Isometric Shoulder Extension with Towel  - 1-2 x daily - 2 sets - 10 reps - 5 sec hold  - Seated Scapular Retraction  - 1-2 x daily - 2 sets - 10 reps - 2 seconds hold    Patient Education  - Log Roll          [1]   Patient Active Problem List  Diagnosis    1st MTP arthritis    Anxiety    Asthma    Change in multiple nevi    Cyst of tendon sheath    Ganglion cyst    Lumbago    Mild episode of recurrent major depressive disorder    Supraumbilical hernia    Vitamin D deficiency    Sprain of sacroiliac ligament    Abnormal EKG    Family history of ischemic heart disease    Encounter to establish care with new doctor    Medical marijuana use    Never smoked cigarettes    BMI 26.0-26.9,adult    Tonsillar hypertrophy    Radiculopathy, cervical    Cubital tunnel syndrome, right    Acute pain of right shoulder    Biceps tendinitis of right upper extremity

## 2025-08-08 ENCOUNTER — TELEPHONE (OUTPATIENT)
Dept: PHYSICAL THERAPY | Facility: CLINIC | Age: 35
End: 2025-08-08
Payer: MEDICAID

## 2025-08-11 ENCOUNTER — APPOINTMENT (OUTPATIENT)
Dept: ORTHOPEDIC SURGERY | Facility: CLINIC | Age: 35
End: 2025-08-11
Payer: MEDICAID

## 2025-08-11 ENCOUNTER — APPOINTMENT (OUTPATIENT)
Dept: RADIOLOGY | Facility: HOSPITAL | Age: 35
End: 2025-08-11
Payer: MEDICAID

## 2025-08-11 ENCOUNTER — ANCILLARY PROCEDURE (OUTPATIENT)
Dept: ORTHOPEDIC SURGERY | Facility: CLINIC | Age: 35
End: 2025-08-11
Payer: MEDICAID

## 2025-08-11 DIAGNOSIS — M54.12 CERVICAL RADICULOPATHY: ICD-10-CM

## 2025-08-11 DIAGNOSIS — M54.12 CERVICAL RADICULOPATHY: Primary | ICD-10-CM

## 2025-08-11 PROCEDURE — 1036F TOBACCO NON-USER: CPT | Performed by: PHYSICIAN ASSISTANT

## 2025-08-11 PROCEDURE — 72050 X-RAY EXAM NECK SPINE 4/5VWS: CPT | Performed by: ORTHOPAEDIC SURGERY

## 2025-08-11 PROCEDURE — 99214 OFFICE O/P EST MOD 30 MIN: CPT | Performed by: PHYSICIAN ASSISTANT

## 2025-08-12 ENCOUNTER — TREATMENT (OUTPATIENT)
Dept: PHYSICAL THERAPY | Facility: CLINIC | Age: 35
End: 2025-08-12
Payer: MEDICAID

## 2025-08-12 PROCEDURE — 97110 THERAPEUTIC EXERCISES: CPT | Mod: GP,CQ

## 2025-08-12 PROCEDURE — 97140 MANUAL THERAPY 1/> REGIONS: CPT | Mod: GP,CQ

## 2025-08-14 ENCOUNTER — TREATMENT (OUTPATIENT)
Dept: PHYSICAL THERAPY | Facility: CLINIC | Age: 35
End: 2025-08-14
Payer: MEDICAID

## 2025-08-14 PROCEDURE — 97110 THERAPEUTIC EXERCISES: CPT | Mod: GP,CQ

## 2025-08-14 PROCEDURE — 97140 MANUAL THERAPY 1/> REGIONS: CPT | Mod: GP,CQ

## 2025-08-19 ENCOUNTER — APPOINTMENT (OUTPATIENT)
Dept: RADIOLOGY | Facility: HOSPITAL | Age: 35
End: 2025-08-19
Payer: MEDICAID

## 2025-08-19 ENCOUNTER — TREATMENT (OUTPATIENT)
Dept: PHYSICAL THERAPY | Facility: CLINIC | Age: 35
End: 2025-08-19
Payer: MEDICAID

## 2025-08-19 DIAGNOSIS — M25.511 ACUTE PAIN OF RIGHT SHOULDER: ICD-10-CM

## 2025-08-19 PROCEDURE — 97110 THERAPEUTIC EXERCISES: CPT | Mod: GP,CQ

## 2025-08-19 PROCEDURE — 97140 MANUAL THERAPY 1/> REGIONS: CPT | Mod: GP,CQ

## 2025-08-19 PROCEDURE — 97014 ELECTRIC STIMULATION THERAPY: CPT | Mod: GP,CQ

## 2025-08-22 ENCOUNTER — APPOINTMENT (OUTPATIENT)
Dept: RADIOLOGY | Facility: HOSPITAL | Age: 35
End: 2025-08-22
Payer: MEDICAID

## 2025-08-25 ENCOUNTER — TREATMENT (OUTPATIENT)
Dept: PHYSICAL THERAPY | Facility: CLINIC | Age: 35
End: 2025-08-25
Payer: MEDICAID

## 2025-08-25 DIAGNOSIS — M25.511 ACUTE PAIN OF RIGHT SHOULDER: ICD-10-CM

## 2025-08-25 PROCEDURE — 97110 THERAPEUTIC EXERCISES: CPT | Mod: GP | Performed by: PHYSICAL THERAPIST

## 2025-08-25 PROCEDURE — 97032 APPL MODALITY 1+ESTIM EA 15: CPT | Mod: GP | Performed by: PHYSICAL THERAPIST

## 2025-08-25 PROCEDURE — 97140 MANUAL THERAPY 1/> REGIONS: CPT | Mod: GP | Performed by: PHYSICAL THERAPIST

## 2025-09-03 ENCOUNTER — TREATMENT (OUTPATIENT)
Dept: PHYSICAL THERAPY | Facility: CLINIC | Age: 35
End: 2025-09-03
Payer: MEDICAID

## 2025-09-03 DIAGNOSIS — M25.511 ACUTE PAIN OF RIGHT SHOULDER: ICD-10-CM

## 2025-09-03 PROCEDURE — 97110 THERAPEUTIC EXERCISES: CPT | Mod: GP

## 2025-09-03 PROCEDURE — 97140 MANUAL THERAPY 1/> REGIONS: CPT | Mod: GP

## 2025-09-03 PROCEDURE — 97032 APPL MODALITY 1+ESTIM EA 15: CPT | Mod: GP

## 2025-09-22 ENCOUNTER — APPOINTMENT (OUTPATIENT)
Dept: ORTHOPEDIC SURGERY | Facility: CLINIC | Age: 35
End: 2025-09-22
Payer: MEDICAID

## 2026-04-14 ENCOUNTER — APPOINTMENT (OUTPATIENT)
Dept: CARDIOLOGY | Facility: CLINIC | Age: 36
End: 2026-04-14
Payer: MEDICAID